# Patient Record
Sex: FEMALE | Race: BLACK OR AFRICAN AMERICAN | NOT HISPANIC OR LATINO | Employment: OTHER | URBAN - METROPOLITAN AREA
[De-identification: names, ages, dates, MRNs, and addresses within clinical notes are randomized per-mention and may not be internally consistent; named-entity substitution may affect disease eponyms.]

---

## 2017-01-06 ENCOUNTER — TELEPHONE (OUTPATIENT)
Dept: RADIOLOGY | Facility: HOSPITAL | Age: 62
End: 2017-01-06

## 2017-01-09 ENCOUNTER — HOSPITAL ENCOUNTER (OUTPATIENT)
Dept: RADIOLOGY | Facility: HOSPITAL | Age: 62
Discharge: HOME | End: 2017-01-09
Attending: PAIN MEDICINE
Payer: MEDICARE

## 2017-01-09 DIAGNOSIS — M54.12 CERVICAL RADICULOPATHY: ICD-10-CM

## 2017-01-09 DIAGNOSIS — M54.16 LUMBAR RADICULOPATHY: ICD-10-CM

## 2017-01-09 PROCEDURE — 72141 MRI NECK SPINE W/O DYE: CPT | Mod: 26,,, | Performed by: RADIOLOGY

## 2017-01-09 PROCEDURE — 72141 MRI NECK SPINE W/O DYE: CPT | Mod: TC,PO

## 2017-01-09 PROCEDURE — 72148 MRI LUMBAR SPINE W/O DYE: CPT | Mod: TC,PO

## 2017-01-09 PROCEDURE — 72148 MRI LUMBAR SPINE W/O DYE: CPT | Mod: 26,,, | Performed by: RADIOLOGY

## 2017-01-30 ENCOUNTER — TELEPHONE (OUTPATIENT)
Dept: SMOKING CESSATION | Facility: CLINIC | Age: 62
End: 2017-01-30

## 2017-01-30 NOTE — TELEPHONE ENCOUNTER
Notified Mrs. Spence that she can renew her benefits.  She will call me mid February to schedule sccon.

## 2017-02-14 ENCOUNTER — CLINICAL SUPPORT (OUTPATIENT)
Dept: SMOKING CESSATION | Facility: CLINIC | Age: 62
End: 2017-02-14
Payer: COMMERCIAL

## 2017-02-14 DIAGNOSIS — F17.200 NICOTINE DEPENDENCE: Primary | ICD-10-CM

## 2017-02-14 PROCEDURE — 99407 BEHAV CHNG SMOKING > 10 MIN: CPT | Mod: S$GLB,,, | Performed by: GENERAL PRACTICE

## 2017-04-07 RX ORDER — SODIUM, POTASSIUM,MAG SULFATES 17.5-3.13G
SOLUTION, RECONSTITUTED, ORAL ORAL
Qty: 354 ML | Refills: 0 | Status: SHIPPED | OUTPATIENT
Start: 2017-04-07 | End: 2022-02-15

## 2017-06-07 ENCOUNTER — LAB VISIT (OUTPATIENT)
Dept: LAB | Facility: HOSPITAL | Age: 62
End: 2017-06-07
Attending: INTERNAL MEDICINE
Payer: MEDICARE

## 2017-06-07 DIAGNOSIS — N18.30 CHRONIC KIDNEY DISEASE, STAGE III (MODERATE): ICD-10-CM

## 2017-06-07 DIAGNOSIS — N18.30 CHRONIC KIDNEY DISEASE, STAGE III (MODERATE): Primary | ICD-10-CM

## 2017-06-07 LAB
ALBUMIN SERPL BCP-MCNC: 3.4 G/DL
ANION GAP SERPL CALC-SCNC: 13 MMOL/L
BUN SERPL-MCNC: 13 MG/DL
CALCIUM SERPL-MCNC: 10.6 MG/DL
CHLORIDE SERPL-SCNC: 100 MMOL/L
CO2 SERPL-SCNC: 26 MMOL/L
CREAT SERPL-MCNC: 1.1 MG/DL
EST. GFR  (AFRICAN AMERICAN): >60 ML/MIN/1.73 M^2
EST. GFR  (NON AFRICAN AMERICAN): 54.3 ML/MIN/1.73 M^2
GLUCOSE SERPL-MCNC: 125 MG/DL
PHOSPHATE SERPL-MCNC: 3.5 MG/DL
POTASSIUM SERPL-SCNC: 4.4 MMOL/L
SODIUM SERPL-SCNC: 139 MMOL/L

## 2017-06-07 PROCEDURE — 86334 IMMUNOFIX E-PHORESIS SERUM: CPT | Mod: 26,,, | Performed by: PATHOLOGY

## 2017-06-07 PROCEDURE — 36415 COLL VENOUS BLD VENIPUNCTURE: CPT | Mod: PO

## 2017-06-07 PROCEDURE — 80069 RENAL FUNCTION PANEL: CPT | Mod: PO

## 2017-06-07 PROCEDURE — 83970 ASSAY OF PARATHORMONE: CPT

## 2017-06-07 PROCEDURE — 86334 IMMUNOFIX E-PHORESIS SERUM: CPT

## 2017-06-07 PROCEDURE — 82306 VITAMIN D 25 HYDROXY: CPT

## 2017-06-08 LAB
25(OH)D3+25(OH)D2 SERPL-MCNC: 19 NG/ML
INTERPRETATION SERPL IFE-IMP: NORMAL
PATHOLOGIST INTERPRETATION IFE: NORMAL
PTH-INTACT SERPL-MCNC: 36 PG/ML

## 2017-07-03 DIAGNOSIS — N18.30 CHRONIC KIDNEY DISEASE, STAGE III (MODERATE): Primary | ICD-10-CM

## 2017-07-05 ENCOUNTER — LAB VISIT (OUTPATIENT)
Dept: LAB | Facility: HOSPITAL | Age: 62
End: 2017-07-05
Attending: INTERNAL MEDICINE
Payer: MEDICARE

## 2017-07-05 DIAGNOSIS — N18.30 CHRONIC KIDNEY DISEASE, STAGE III (MODERATE): ICD-10-CM

## 2017-07-05 DIAGNOSIS — N18.30 CHRONIC KIDNEY DISEASE, STAGE III (MODERATE): Primary | ICD-10-CM

## 2017-07-05 LAB
ALBUMIN SERPL BCP-MCNC: 3.2 G/DL
ANION GAP SERPL CALC-SCNC: 14 MMOL/L
BASOPHILS # BLD AUTO: 0.04 K/UL
BASOPHILS NFR BLD: 0.3 %
BUN SERPL-MCNC: 19 MG/DL
CALCIUM SERPL-MCNC: 10.3 MG/DL
CHLORIDE SERPL-SCNC: 96 MMOL/L
CO2 SERPL-SCNC: 26 MMOL/L
CREAT SERPL-MCNC: 1.1 MG/DL
DIFFERENTIAL METHOD: ABNORMAL
EOSINOPHIL # BLD AUTO: 0.2 K/UL
EOSINOPHIL NFR BLD: 1.6 %
ERYTHROCYTE [DISTWIDTH] IN BLOOD BY AUTOMATED COUNT: 13 %
EST. GFR  (AFRICAN AMERICAN): >60 ML/MIN/1.73 M^2
EST. GFR  (NON AFRICAN AMERICAN): 54.3 ML/MIN/1.73 M^2
GLUCOSE SERPL-MCNC: 244 MG/DL
HCT VFR BLD AUTO: 47.2 %
HGB BLD-MCNC: 15.3 G/DL
LYMPHOCYTES # BLD AUTO: 2.4 K/UL
LYMPHOCYTES NFR BLD: 19.7 %
MCH RBC QN AUTO: 29.4 PG
MCHC RBC AUTO-ENTMCNC: 32.4 %
MCV RBC AUTO: 91 FL
MONOCYTES # BLD AUTO: 0.4 K/UL
MONOCYTES NFR BLD: 3.3 %
NEUTROPHILS # BLD AUTO: 9.2 K/UL
NEUTROPHILS NFR BLD: 74.9 %
PHOSPHATE SERPL-MCNC: 4.1 MG/DL
PLATELET # BLD AUTO: 414 K/UL
PLATELET # BLD AUTO: 414 K/UL
PMV BLD AUTO: 10.3 FL
PMV BLD AUTO: 10.3 FL
POTASSIUM SERPL-SCNC: 4.3 MMOL/L
RBC # BLD AUTO: 5.2 M/UL
SODIUM SERPL-SCNC: 136 MMOL/L
WBC # BLD AUTO: 12.22 K/UL

## 2017-07-05 PROCEDURE — 80069 RENAL FUNCTION PANEL: CPT | Mod: PO

## 2017-07-05 PROCEDURE — 36415 COLL VENOUS BLD VENIPUNCTURE: CPT | Mod: PO

## 2017-07-05 PROCEDURE — 85025 COMPLETE CBC W/AUTO DIFF WBC: CPT | Mod: PO

## 2018-04-17 ENCOUNTER — CLINICAL SUPPORT (OUTPATIENT)
Dept: SMOKING CESSATION | Facility: CLINIC | Age: 63
End: 2018-04-17
Payer: COMMERCIAL

## 2018-04-17 DIAGNOSIS — F17.200 NICOTINE DEPENDENCE: Primary | ICD-10-CM

## 2018-04-17 PROCEDURE — 99407 BEHAV CHNG SMOKING > 10 MIN: CPT | Mod: S$GLB,,, | Performed by: INTERNAL MEDICINE

## 2018-06-07 ENCOUNTER — HOSPITAL ENCOUNTER (EMERGENCY)
Facility: HOSPITAL | Age: 63
Discharge: HOME | End: 2018-06-07
Attending: EMERGENCY MEDICINE
Payer: MEDICARE

## 2018-06-07 VITALS
DIASTOLIC BLOOD PRESSURE: 66 MMHG | RESPIRATION RATE: 18 BRPM | OXYGEN SATURATION: 96 % | SYSTOLIC BLOOD PRESSURE: 124 MMHG | WEIGHT: 176.38 LBS | BODY MASS INDEX: 34.45 KG/M2 | TEMPERATURE: 98 F | HEART RATE: 89 BPM

## 2018-06-07 DIAGNOSIS — J02.9 SORE THROAT: Primary | ICD-10-CM

## 2018-06-07 DIAGNOSIS — R05.9 COUGH: ICD-10-CM

## 2018-06-07 LAB — DEPRECATED S PYO AG THROAT QL EIA: NEGATIVE

## 2018-06-07 PROCEDURE — 87081 CULTURE SCREEN ONLY: CPT

## 2018-06-07 PROCEDURE — 87880 STREP A ASSAY W/OPTIC: CPT

## 2018-06-07 PROCEDURE — 99284 EMERGENCY DEPT VISIT MOD MDM: CPT

## 2018-06-07 RX ORDER — FOLIC ACID 1 MG/1
1 TABLET ORAL
COMMUNITY
Start: 2018-05-18 | End: 2019-05-18

## 2018-06-07 RX ORDER — INSULIN DEGLUDEC 200 U/ML
INJECTION, SOLUTION SUBCUTANEOUS
COMMUNITY
Start: 2017-12-13 | End: 2022-02-15

## 2018-06-07 RX ORDER — FERROUS SULFATE 325(65) MG
325 TABLET ORAL
COMMUNITY
Start: 2018-05-18 | End: 2019-05-18

## 2018-06-07 RX ORDER — ICOSAPENT ETHYL 1000 MG/1
2 CAPSULE ORAL
COMMUNITY
Start: 2018-03-13

## 2018-06-07 RX ORDER — PROMETHAZINE HYDROCHLORIDE AND DEXTROMETHORPHAN HYDROBROMIDE 6.25; 15 MG/5ML; MG/5ML
5 SYRUP ORAL EVERY 6 HOURS PRN
Qty: 120 ML | Refills: 0 | Status: SHIPPED | OUTPATIENT
Start: 2018-06-07 | End: 2018-06-17

## 2018-06-07 RX ORDER — ROSUVASTATIN CALCIUM 5 MG/1
5 TABLET, COATED ORAL
COMMUNITY
Start: 2018-05-08 | End: 2019-05-08

## 2018-06-07 RX ORDER — MIRTAZAPINE 7.5 MG/1
7.5 TABLET, FILM COATED ORAL
COMMUNITY

## 2018-06-07 RX ORDER — PANTOPRAZOLE SODIUM 40 MG/1
40 TABLET, DELAYED RELEASE ORAL
COMMUNITY
End: 2022-08-16

## 2018-06-07 RX ORDER — LEVOTHYROXINE SODIUM 50 UG/1
50 TABLET ORAL
COMMUNITY

## 2018-06-07 RX ORDER — METFORMIN HYDROCHLORIDE 1000 MG/1
1000 TABLET ORAL
COMMUNITY
Start: 2018-05-19 | End: 2022-01-24

## 2018-06-07 RX ORDER — EZETIMIBE 10 MG/1
10 TABLET ORAL
COMMUNITY
Start: 2018-04-03 | End: 2019-04-03

## 2018-06-07 RX ORDER — AMLODIPINE BESYLATE 5 MG/1
2.5 TABLET ORAL
COMMUNITY
Start: 2018-05-17

## 2018-06-07 NOTE — ED PROVIDER NOTES
Encounter Date: 6/7/2018       History     Chief Complaint   Patient presents with    Sore Throat     sore throat and hoarse onset yesterday, recent adm for pneumonia     The history is provided by the patient.   Sore Throat   This is a new problem. The current episode started yesterday. The problem occurs constantly. The problem has not changed since onset.Pertinent negatives include no chest pain, no abdominal pain, no headaches and no shortness of breath. Nothing aggravates the symptoms. Nothing relieves the symptoms. She has tried nothing for the symptoms.     Review of patient's allergies indicates:  No Known Allergies  Past Medical History:   Diagnosis Date    Anemia     Anxiety     Anxiety associated with depression     High cholesterol     Hypertension     Mental disorder      Past Surgical History:   Procedure Laterality Date    CARPAL TUNNEL RELEASE      carpr       Family History   Problem Relation Age of Onset    Hypertension Father     Stroke Father     Ovarian cancer Mother     Breast cancer Cousin     Cancer Son         non Hodgskin lymphoma    Colon cancer Maternal Aunt     Diabetes Neg Hx      Social History   Substance Use Topics    Smoking status: Current Every Day Smoker     Packs/day: 1.00     Types: Cigarettes    Smokeless tobacco: Never Used    Alcohol use No     Review of Systems   Constitutional: Negative for fever.   HENT: Positive for sore throat.    Respiratory: Negative for shortness of breath.    Cardiovascular: Negative for chest pain.   Gastrointestinal: Negative for abdominal pain and nausea.   Genitourinary: Negative for dysuria.   Musculoskeletal: Negative for back pain.   Skin: Negative for rash.   Neurological: Negative for weakness and headaches.   Hematological: Does not bruise/bleed easily.       Physical Exam     Initial Vitals [06/07/18 0904]   BP Pulse Resp Temp SpO2   129/67 101 18 97.7 °F (36.5 °C) (!) 93 %      MAP       87.67         Physical  Exam    Nursing note and vitals reviewed.  Constitutional: She appears well-developed and well-nourished. No distress.   HENT:   Head: Normocephalic and atraumatic.   Mouth/Throat: Posterior oropharyngeal erythema present. No oropharyngeal exudate.   Eyes: Conjunctivae and EOM are normal. Pupils are equal, round, and reactive to light.   Neck: Normal range of motion. Neck supple.   Cardiovascular: Normal rate, regular rhythm and normal heart sounds. Exam reveals no gallop and no friction rub.    No murmur heard.  Pulmonary/Chest: Breath sounds normal. No respiratory distress. She has no wheezes. She has no rhonchi. She has no rales.   Abdominal: Soft. Bowel sounds are normal. She exhibits no distension and no mass. There is no tenderness. There is no rebound and no guarding.   Musculoskeletal: Normal range of motion. She exhibits no edema or tenderness.   Neurological: She is alert and oriented to person, place, and time. She has normal strength.   Skin: Skin is warm and dry. No rash noted.   Psychiatric: She has a normal mood and affect. Thought content normal.         ED Course   Procedures  Labs Reviewed   THROAT SCREEN, RAPID   CULTURE, STREP A,  THROAT          X-Ray Chest PA And Lateral   Final Result      No acute findings.         Electronically signed by: Srinath Garcia MD   Date:    06/07/2018   Time:    09:47        ED Vital Signs:  Vitals:    06/07/18 0904   BP: 129/67   Pulse: 101   Resp: 18   Temp: 97.7 °F (36.5 °C)   TempSrc: Oral   SpO2: (!) 93%   Weight: 80 kg (176 lb 6.4 oz)         Abnormal Lab Results:  Labs Reviewed   THROAT SCREEN, RAPID   CULTURE, STREP A,  THROAT          All Lab Results:  Results for orders placed or performed during the hospital encounter of 06/07/18   Rapid strep screen   Result Value Ref Range    Rapid Strep A Screen Negative Negative           Imaging Results:  Imaging Results          X-Ray Chest PA And Lateral (Final result)  Result time 06/07/18 09:47:47    Final  result by Francisco J Garcia Jr., MD (06/07/18 09:47:47)                 Impression:      No acute findings.      Electronically signed by: Francisco J Garcia MD  Date:    06/07/2018  Time:    09:47             Narrative:    EXAMINATION:  XR CHEST PA AND LATERAL    CLINICAL HISTORY:  cough;    COMPARISON:  No comparison studies are available.    FINDINGS:  Heart size is normal.  Lungs appear clear of active disease.  No infiltrates or effusions.  No suspicious mass.                                   The Emergency Provider reviewed the vital signs and test results, which are outlined above.    ED Discussions:  9:58 AM: Reassessed pt at this time.  Pt states her condition has improved at this time. Discussed with pt all pertinent ED information and results. Discussed pt dx of sore throat and cough and plan of tx. Gave pt all f/u and return to the ED instructions. All questions and concerns were addressed at this time. Pt expresses understanding of information and instructions, and is comfortable with plan to discharge. Pt is stable for discharge.                                  Clinical Impression:       ICD-10-CM ICD-9-CM   1. Sore throat J02.9 462   2. Cough R05 786.2           Disposition:   Disposition: Discharged  Condition: Stable                        Aristeo Whiting MD  06/07/18 0958

## 2018-06-09 LAB — BACTERIA THROAT CULT: NORMAL

## 2018-06-29 ENCOUNTER — LAB VISIT (OUTPATIENT)
Dept: LAB | Facility: HOSPITAL | Age: 63
End: 2018-06-29
Attending: INTERNAL MEDICINE
Payer: MEDICARE

## 2018-06-29 DIAGNOSIS — N18.30 CHRONIC KIDNEY DISEASE, STAGE III (MODERATE): ICD-10-CM

## 2018-06-29 DIAGNOSIS — N18.30 CHRONIC KIDNEY DISEASE, STAGE III (MODERATE): Primary | ICD-10-CM

## 2018-06-29 LAB
25(OH)D3+25(OH)D2 SERPL-MCNC: 46 NG/ML
ALBUMIN SERPL BCP-MCNC: 3.3 G/DL
ANION GAP SERPL CALC-SCNC: 16 MMOL/L
BASOPHILS # BLD AUTO: 0.06 K/UL
BASOPHILS NFR BLD: 0.5 %
BUN SERPL-MCNC: 15 MG/DL
CALCIUM SERPL-MCNC: 9.6 MG/DL
CHLORIDE SERPL-SCNC: 101 MMOL/L
CO2 SERPL-SCNC: 26 MMOL/L
CREAT SERPL-MCNC: 1 MG/DL
DIFFERENTIAL METHOD: ABNORMAL
EOSINOPHIL # BLD AUTO: 0.2 K/UL
EOSINOPHIL NFR BLD: 1.7 %
ERYTHROCYTE [DISTWIDTH] IN BLOOD BY AUTOMATED COUNT: 16.1 %
EST. GFR  (AFRICAN AMERICAN): >60 ML/MIN/1.73 M^2
EST. GFR  (NON AFRICAN AMERICAN): >60 ML/MIN/1.73 M^2
GLUCOSE SERPL-MCNC: 182 MG/DL
HCT VFR BLD AUTO: 39.3 %
HGB BLD-MCNC: 12.2 G/DL
LYMPHOCYTES # BLD AUTO: 2.4 K/UL
LYMPHOCYTES NFR BLD: 18.3 %
MCH RBC QN AUTO: 25.1 PG
MCHC RBC AUTO-ENTMCNC: 31 G/DL
MCV RBC AUTO: 81 FL
MONOCYTES # BLD AUTO: 0.3 K/UL
MONOCYTES NFR BLD: 2.6 %
NEUTROPHILS # BLD AUTO: 9.9 K/UL
NEUTROPHILS NFR BLD: 76.6 %
PHOSPHATE SERPL-MCNC: 2.2 MG/DL
PLATELET # BLD AUTO: 427 K/UL
PLATELET # BLD AUTO: 427 K/UL
PMV BLD AUTO: 10.2 FL
PMV BLD AUTO: 10.2 FL
POTASSIUM SERPL-SCNC: 3.4 MMOL/L
PTH-INTACT SERPL-MCNC: 85 PG/ML
RBC # BLD AUTO: 4.86 M/UL
SODIUM SERPL-SCNC: 143 MMOL/L
WBC # BLD AUTO: 12.85 K/UL

## 2018-06-29 PROCEDURE — 83970 ASSAY OF PARATHORMONE: CPT

## 2018-06-29 PROCEDURE — 85025 COMPLETE CBC W/AUTO DIFF WBC: CPT | Mod: PO

## 2018-06-29 PROCEDURE — 36415 COLL VENOUS BLD VENIPUNCTURE: CPT | Mod: PO

## 2018-06-29 PROCEDURE — 82306 VITAMIN D 25 HYDROXY: CPT

## 2018-06-29 PROCEDURE — 80069 RENAL FUNCTION PANEL: CPT | Mod: PO

## 2019-01-04 DIAGNOSIS — N18.30 CHRONIC KIDNEY DISEASE, STAGE III (MODERATE): Primary | ICD-10-CM

## 2019-01-07 ENCOUNTER — LAB VISIT (OUTPATIENT)
Dept: LAB | Facility: HOSPITAL | Age: 64
End: 2019-01-07
Attending: INTERNAL MEDICINE
Payer: MEDICARE

## 2019-01-07 DIAGNOSIS — N18.30 CHRONIC KIDNEY DISEASE, STAGE III (MODERATE): ICD-10-CM

## 2019-01-07 LAB
ALBUMIN SERPL BCP-MCNC: 3.9 G/DL
ANION GAP SERPL CALC-SCNC: 15 MMOL/L
BASOPHILS # BLD AUTO: 0.05 K/UL
BASOPHILS NFR BLD: 0.5 %
BUN SERPL-MCNC: 13 MG/DL
CALCIUM SERPL-MCNC: 9.9 MG/DL
CHLORIDE SERPL-SCNC: 98 MMOL/L
CO2 SERPL-SCNC: 30 MMOL/L
CREAT SERPL-MCNC: 1 MG/DL
DIFFERENTIAL METHOD: ABNORMAL
EOSINOPHIL # BLD AUTO: 0.2 K/UL
EOSINOPHIL NFR BLD: 1.5 %
ERYTHROCYTE [DISTWIDTH] IN BLOOD BY AUTOMATED COUNT: 14.3 %
EST. GFR  (AFRICAN AMERICAN): >60 ML/MIN/1.73 M^2
EST. GFR  (NON AFRICAN AMERICAN): >60 ML/MIN/1.73 M^2
GLUCOSE SERPL-MCNC: 81 MG/DL
HCT VFR BLD AUTO: 43.4 %
HGB BLD-MCNC: 14.4 G/DL
LYMPHOCYTES # BLD AUTO: 2.5 K/UL
LYMPHOCYTES NFR BLD: 22.4 %
MCH RBC QN AUTO: 28.5 PG
MCHC RBC AUTO-ENTMCNC: 33.2 G/DL
MCV RBC AUTO: 86 FL
MONOCYTES # BLD AUTO: 0.4 K/UL
MONOCYTES NFR BLD: 3.4 %
NEUTROPHILS # BLD AUTO: 7.9 K/UL
NEUTROPHILS NFR BLD: 72 %
PHOSPHATE SERPL-MCNC: 3.7 MG/DL
PLATELET # BLD AUTO: 355 K/UL
PMV BLD AUTO: 10.1 FL
POTASSIUM SERPL-SCNC: 3 MMOL/L
RBC # BLD AUTO: 5.06 M/UL
SODIUM SERPL-SCNC: 143 MMOL/L
WBC # BLD AUTO: 10.97 K/UL

## 2019-01-07 PROCEDURE — 85025 COMPLETE CBC W/AUTO DIFF WBC: CPT | Mod: PO,ER

## 2019-01-07 PROCEDURE — 36415 COLL VENOUS BLD VENIPUNCTURE: CPT | Mod: PO,ER

## 2019-01-07 PROCEDURE — 80069 RENAL FUNCTION PANEL: CPT | Mod: PO,ER

## 2019-01-24 ENCOUNTER — HOSPITAL ENCOUNTER (OUTPATIENT)
Dept: RADIOLOGY | Facility: HOSPITAL | Age: 64
Discharge: HOME | End: 2019-01-24
Attending: FAMILY MEDICINE
Payer: MEDICARE

## 2019-01-24 VITALS — WEIGHT: 154 LBS | HEIGHT: 60 IN | BODY MASS INDEX: 30.23 KG/M2

## 2019-01-24 DIAGNOSIS — Z12.31 ENCOUNTER FOR SCREENING MAMMOGRAM FOR MALIGNANT NEOPLASM OF BREAST: ICD-10-CM

## 2019-01-24 PROCEDURE — 77067 SCR MAMMO BI INCL CAD: CPT | Mod: 26,,, | Performed by: RADIOLOGY

## 2019-01-24 PROCEDURE — 77067 MAMMO DIGITAL SCREENING BILAT WITH CAD: ICD-10-PCS | Mod: 26,,, | Performed by: RADIOLOGY

## 2019-01-24 PROCEDURE — 77067 SCR MAMMO BI INCL CAD: CPT | Mod: TC,PO

## 2019-08-13 ENCOUNTER — LAB VISIT (OUTPATIENT)
Dept: LAB | Facility: HOSPITAL | Age: 64
End: 2019-08-13
Attending: INTERNAL MEDICINE
Payer: MEDICARE

## 2019-08-13 DIAGNOSIS — N18.30 CHRONIC KIDNEY DISEASE, STAGE III (MODERATE): Primary | ICD-10-CM

## 2019-08-13 LAB
25(OH)D3+25(OH)D2 SERPL-MCNC: 69 NG/ML (ref 30–96)
ALBUMIN SERPL BCP-MCNC: 3.7 G/DL (ref 3.5–5.2)
ANION GAP SERPL CALC-SCNC: 12 MMOL/L (ref 8–16)
BASOPHILS # BLD AUTO: 0.04 K/UL (ref 0–0.2)
BASOPHILS NFR BLD: 0.4 % (ref 0–1.9)
BUN SERPL-MCNC: 16 MG/DL (ref 8–23)
CALCIUM SERPL-MCNC: 10.3 MG/DL (ref 8.7–10.5)
CHLORIDE SERPL-SCNC: 103 MMOL/L (ref 95–110)
CO2 SERPL-SCNC: 26 MMOL/L (ref 23–29)
CREAT SERPL-MCNC: 1 MG/DL (ref 0.5–1.4)
DIFFERENTIAL METHOD: ABNORMAL
EOSINOPHIL # BLD AUTO: 0.1 K/UL (ref 0–0.5)
EOSINOPHIL NFR BLD: 1.2 % (ref 0–8)
ERYTHROCYTE [DISTWIDTH] IN BLOOD BY AUTOMATED COUNT: 13.4 % (ref 11.5–14.5)
EST. GFR  (AFRICAN AMERICAN): >60 ML/MIN/1.73 M^2
EST. GFR  (NON AFRICAN AMERICAN): >60 ML/MIN/1.73 M^2
GLUCOSE SERPL-MCNC: 77 MG/DL (ref 70–110)
HCT VFR BLD AUTO: 44.9 % (ref 37–48.5)
HGB BLD-MCNC: 14.8 G/DL (ref 12–16)
LYMPHOCYTES # BLD AUTO: 2.8 K/UL (ref 1–4.8)
LYMPHOCYTES NFR BLD: 25.9 % (ref 18–48)
MCH RBC QN AUTO: 29.5 PG (ref 27–31)
MCHC RBC AUTO-ENTMCNC: 33 G/DL (ref 32–36)
MCV RBC AUTO: 90 FL (ref 82–98)
MONOCYTES # BLD AUTO: 0.4 K/UL (ref 0.3–1)
MONOCYTES NFR BLD: 3.6 % (ref 4–15)
NEUTROPHILS # BLD AUTO: 7.4 K/UL (ref 1.8–7.7)
NEUTROPHILS NFR BLD: 68.9 % (ref 38–73)
PHOSPHATE SERPL-MCNC: 3.9 MG/DL (ref 2.7–4.5)
PLATELET # BLD AUTO: 350 K/UL (ref 150–350)
PMV BLD AUTO: 9.7 FL (ref 9.2–12.9)
POTASSIUM SERPL-SCNC: 4.1 MMOL/L (ref 3.5–5.1)
PTH-INTACT SERPL-MCNC: 41 PG/ML (ref 9–77)
RBC # BLD AUTO: 5.01 M/UL (ref 4–5.4)
SODIUM SERPL-SCNC: 141 MMOL/L (ref 136–145)
WBC # BLD AUTO: 10.67 K/UL (ref 3.9–12.7)

## 2019-08-13 PROCEDURE — 82306 VITAMIN D 25 HYDROXY: CPT

## 2019-08-13 PROCEDURE — 83970 ASSAY OF PARATHORMONE: CPT

## 2019-08-13 PROCEDURE — 85025 COMPLETE CBC W/AUTO DIFF WBC: CPT | Mod: PO

## 2019-08-13 PROCEDURE — 80069 RENAL FUNCTION PANEL: CPT | Mod: PO

## 2019-08-13 PROCEDURE — 36415 COLL VENOUS BLD VENIPUNCTURE: CPT | Mod: PO

## 2020-02-24 ENCOUNTER — LAB VISIT (OUTPATIENT)
Dept: LAB | Facility: HOSPITAL | Age: 65
End: 2020-02-24
Attending: INTERNAL MEDICINE
Payer: MEDICARE

## 2020-02-24 DIAGNOSIS — N18.30 CHRONIC KIDNEY DISEASE, STAGE III (MODERATE): ICD-10-CM

## 2020-02-24 LAB
ALBUMIN SERPL BCP-MCNC: 3.6 G/DL (ref 3.5–5.2)
ANION GAP SERPL CALC-SCNC: 11 MMOL/L (ref 8–16)
BASOPHILS # BLD AUTO: 0.09 K/UL (ref 0–0.2)
BASOPHILS NFR BLD: 0.7 % (ref 0–1.9)
BUN SERPL-MCNC: 11 MG/DL (ref 8–23)
CALCIUM SERPL-MCNC: 9.6 MG/DL (ref 8.7–10.5)
CHLORIDE SERPL-SCNC: 101 MMOL/L (ref 95–110)
CO2 SERPL-SCNC: 31 MMOL/L (ref 23–29)
CREAT SERPL-MCNC: 0.9 MG/DL (ref 0.5–1.4)
DIFFERENTIAL METHOD: ABNORMAL
EOSINOPHIL # BLD AUTO: 0.2 K/UL (ref 0–0.5)
EOSINOPHIL NFR BLD: 1.3 % (ref 0–8)
ERYTHROCYTE [DISTWIDTH] IN BLOOD BY AUTOMATED COUNT: 12.6 % (ref 11.5–14.5)
EST. GFR  (AFRICAN AMERICAN): >60 ML/MIN/1.73 M^2
EST. GFR  (NON AFRICAN AMERICAN): >60 ML/MIN/1.73 M^2
GLUCOSE SERPL-MCNC: 123 MG/DL (ref 70–110)
HCT VFR BLD AUTO: 46.8 % (ref 37–48.5)
HGB BLD-MCNC: 15.4 G/DL (ref 12–16)
IMM GRANULOCYTES # BLD AUTO: 0.03 K/UL (ref 0–0.04)
IMM GRANULOCYTES NFR BLD AUTO: 0.2 % (ref 0–0.5)
LYMPHOCYTES # BLD AUTO: 2.9 K/UL (ref 1–4.8)
LYMPHOCYTES NFR BLD: 22.3 % (ref 18–48)
MCH RBC QN AUTO: 29.6 PG (ref 27–31)
MCHC RBC AUTO-ENTMCNC: 32.9 G/DL (ref 32–36)
MCV RBC AUTO: 90 FL (ref 82–98)
MONOCYTES # BLD AUTO: 0.4 K/UL (ref 0.3–1)
MONOCYTES NFR BLD: 2.9 % (ref 4–15)
NEUTROPHILS # BLD AUTO: 9.3 K/UL (ref 1.8–7.7)
NEUTROPHILS NFR BLD: 72.6 % (ref 38–73)
NRBC BLD-RTO: 0 /100 WBC
PHOSPHATE SERPL-MCNC: 2.7 MG/DL (ref 2.7–4.5)
PLATELET # BLD AUTO: 355 K/UL (ref 150–350)
PMV BLD AUTO: 9.5 FL (ref 9.2–12.9)
POTASSIUM SERPL-SCNC: 3.2 MMOL/L (ref 3.5–5.1)
RBC # BLD AUTO: 5.2 M/UL (ref 4–5.4)
SODIUM SERPL-SCNC: 143 MMOL/L (ref 136–145)
WBC # BLD AUTO: 12.85 K/UL (ref 3.9–12.7)

## 2020-02-24 PROCEDURE — 36415 COLL VENOUS BLD VENIPUNCTURE: CPT | Mod: PO

## 2020-02-24 PROCEDURE — 80069 RENAL FUNCTION PANEL: CPT | Mod: PO

## 2020-02-24 PROCEDURE — 85025 COMPLETE CBC W/AUTO DIFF WBC: CPT | Mod: PO

## 2020-08-31 ENCOUNTER — HOSPITAL ENCOUNTER (OUTPATIENT)
Dept: RADIOLOGY | Facility: HOSPITAL | Age: 65
Discharge: HOME | End: 2020-08-31
Attending: FAMILY MEDICINE
Payer: MEDICARE

## 2020-08-31 VITALS — WEIGHT: 154.13 LBS | BODY MASS INDEX: 30.26 KG/M2 | HEIGHT: 60 IN

## 2020-08-31 DIAGNOSIS — Z12.31 ENCOUNTER FOR SCREENING MAMMOGRAM FOR MALIGNANT NEOPLASM OF BREAST: ICD-10-CM

## 2020-08-31 PROCEDURE — 77067 MAMMO DIGITAL SCREENING BILAT WITH TOMOSYNTHESIS_CAD: ICD-10-PCS | Mod: 26,,, | Performed by: RADIOLOGY

## 2020-08-31 PROCEDURE — 77063 MAMMO DIGITAL SCREENING BILAT WITH TOMOSYNTHESIS_CAD: ICD-10-PCS | Mod: 26,,, | Performed by: RADIOLOGY

## 2020-08-31 PROCEDURE — 77067 SCR MAMMO BI INCL CAD: CPT | Mod: 26,,, | Performed by: RADIOLOGY

## 2020-08-31 PROCEDURE — 77067 SCR MAMMO BI INCL CAD: CPT | Mod: TC,PO

## 2020-08-31 PROCEDURE — 77063 BREAST TOMOSYNTHESIS BI: CPT | Mod: 26,,, | Performed by: RADIOLOGY

## 2020-10-12 ENCOUNTER — LAB VISIT (OUTPATIENT)
Dept: LAB | Facility: HOSPITAL | Age: 65
End: 2020-10-12
Attending: INTERNAL MEDICINE
Payer: MEDICARE

## 2020-10-12 DIAGNOSIS — R80.9 PROTEINURIA: ICD-10-CM

## 2020-10-12 DIAGNOSIS — N18.2 CHRONIC KIDNEY DISEASE, STAGE II (MILD): Primary | ICD-10-CM

## 2020-10-12 LAB
ALBUMIN SERPL BCP-MCNC: 3.7 G/DL (ref 3.5–5.2)
ANION GAP SERPL CALC-SCNC: 11 MMOL/L (ref 8–16)
BUN SERPL-MCNC: 12 MG/DL (ref 8–23)
CALCIUM SERPL-MCNC: 9.3 MG/DL (ref 8.7–10.5)
CHLORIDE SERPL-SCNC: 103 MMOL/L (ref 95–110)
CO2 SERPL-SCNC: 31 MMOL/L (ref 23–29)
CREAT SERPL-MCNC: 0.9 MG/DL (ref 0.5–1.4)
CREAT UR-MCNC: 9.7 MG/DL (ref 15–325)
EST. GFR  (AFRICAN AMERICAN): >60 ML/MIN/1.73 M^2
EST. GFR  (NON AFRICAN AMERICAN): >60 ML/MIN/1.73 M^2
GLUCOSE SERPL-MCNC: 78 MG/DL (ref 70–110)
PHOSPHATE SERPL-MCNC: 3.3 MG/DL (ref 2.7–4.5)
POTASSIUM SERPL-SCNC: 3.7 MMOL/L (ref 3.5–5.1)
PROT UR-MCNC: <7 MG/DL (ref 0–15)
PROT/CREAT UR: ABNORMAL MG/G{CREAT} (ref 0–0.2)
SODIUM SERPL-SCNC: 145 MMOL/L (ref 136–145)

## 2020-10-12 PROCEDURE — 36415 COLL VENOUS BLD VENIPUNCTURE: CPT | Mod: PO

## 2020-10-12 PROCEDURE — 80069 RENAL FUNCTION PANEL: CPT | Mod: PO

## 2020-10-12 PROCEDURE — 82570 ASSAY OF URINE CREATININE: CPT | Mod: PO

## 2021-02-02 ENCOUNTER — LAB VISIT (OUTPATIENT)
Dept: LAB | Facility: HOSPITAL | Age: 66
End: 2021-02-02
Attending: INTERNAL MEDICINE
Payer: MEDICARE

## 2021-02-02 DIAGNOSIS — N18.2 CHRONIC KIDNEY DISEASE, STAGE II (MILD): ICD-10-CM

## 2021-02-02 LAB
ALBUMIN SERPL BCP-MCNC: 3.8 G/DL (ref 3.5–5.2)
ANION GAP SERPL CALC-SCNC: 13 MMOL/L (ref 8–16)
BUN SERPL-MCNC: 12 MG/DL (ref 8–23)
CALCIUM SERPL-MCNC: 9.6 MG/DL (ref 8.7–10.5)
CHLORIDE SERPL-SCNC: 100 MMOL/L (ref 95–110)
CO2 SERPL-SCNC: 33 MMOL/L (ref 23–29)
CREAT SERPL-MCNC: 1 MG/DL (ref 0.5–1.4)
EST. GFR  (AFRICAN AMERICAN): >60 ML/MIN/1.73 M^2
EST. GFR  (NON AFRICAN AMERICAN): 59.3 ML/MIN/1.73 M^2
GLUCOSE SERPL-MCNC: 87 MG/DL (ref 70–110)
PHOSPHATE SERPL-MCNC: 3 MG/DL (ref 2.7–4.5)
POTASSIUM SERPL-SCNC: 3.3 MMOL/L (ref 3.5–5.1)
SODIUM SERPL-SCNC: 146 MMOL/L (ref 136–145)

## 2021-02-02 PROCEDURE — 36415 COLL VENOUS BLD VENIPUNCTURE: CPT | Mod: PO

## 2021-02-02 PROCEDURE — 80069 RENAL FUNCTION PANEL: CPT | Mod: PO

## 2021-09-23 ENCOUNTER — LAB VISIT (OUTPATIENT)
Dept: LAB | Facility: HOSPITAL | Age: 66
End: 2021-09-23
Attending: FAMILY MEDICINE
Payer: MEDICARE

## 2021-09-23 DIAGNOSIS — N18.2 CHRONIC KIDNEY DISEASE, STAGE II (MILD): Primary | ICD-10-CM

## 2021-09-23 LAB
ALBUMIN SERPL BCP-MCNC: 3.6 G/DL (ref 3.5–5.2)
ANION GAP SERPL CALC-SCNC: 14 MMOL/L (ref 8–16)
BASOPHILS # BLD AUTO: 0.1 K/UL (ref 0–0.2)
BASOPHILS NFR BLD: 0.9 % (ref 0–1.9)
BUN SERPL-MCNC: 14 MG/DL (ref 8–23)
CALCIUM SERPL-MCNC: 9.5 MG/DL (ref 8.7–10.5)
CHLORIDE SERPL-SCNC: 100 MMOL/L (ref 95–110)
CO2 SERPL-SCNC: 28 MMOL/L (ref 23–29)
CREAT SERPL-MCNC: 1 MG/DL (ref 0.5–1.4)
DIFFERENTIAL METHOD: ABNORMAL
EOSINOPHIL # BLD AUTO: 0.1 K/UL (ref 0–0.5)
EOSINOPHIL NFR BLD: 1.2 % (ref 0–8)
ERYTHROCYTE [DISTWIDTH] IN BLOOD BY AUTOMATED COUNT: 13.1 % (ref 11.5–14.5)
EST. GFR  (AFRICAN AMERICAN): >60 ML/MIN/1.73 M^2
EST. GFR  (NON AFRICAN AMERICAN): 59.3 ML/MIN/1.73 M^2
GLUCOSE SERPL-MCNC: 86 MG/DL (ref 70–110)
HCT VFR BLD AUTO: 45.5 % (ref 37–48.5)
HGB BLD-MCNC: 14.5 G/DL (ref 12–16)
IMM GRANULOCYTES # BLD AUTO: 0.05 K/UL (ref 0–0.04)
IMM GRANULOCYTES NFR BLD AUTO: 0.5 % (ref 0–0.5)
LYMPHOCYTES # BLD AUTO: 2.3 K/UL (ref 1–4.8)
LYMPHOCYTES NFR BLD: 21.4 % (ref 18–48)
MCH RBC QN AUTO: 29.6 PG (ref 27–31)
MCHC RBC AUTO-ENTMCNC: 31.9 G/DL (ref 32–36)
MCV RBC AUTO: 93 FL (ref 82–98)
MONOCYTES # BLD AUTO: 0.4 K/UL (ref 0.3–1)
MONOCYTES NFR BLD: 3.9 % (ref 4–15)
NEUTROPHILS # BLD AUTO: 7.6 K/UL (ref 1.8–7.7)
NEUTROPHILS NFR BLD: 72.1 % (ref 38–73)
NRBC BLD-RTO: 0 /100 WBC
PHOSPHATE SERPL-MCNC: 3.1 MG/DL (ref 2.7–4.5)
PLATELET # BLD AUTO: 354 K/UL (ref 150–450)
PMV BLD AUTO: 9.6 FL (ref 9.2–12.9)
POTASSIUM SERPL-SCNC: 4.1 MMOL/L (ref 3.5–5.1)
RBC # BLD AUTO: 4.9 M/UL (ref 4–5.4)
SODIUM SERPL-SCNC: 142 MMOL/L (ref 136–145)
WBC # BLD AUTO: 10.59 K/UL (ref 3.9–12.7)

## 2021-09-23 PROCEDURE — 36415 COLL VENOUS BLD VENIPUNCTURE: CPT | Mod: PO

## 2021-09-23 PROCEDURE — 80069 RENAL FUNCTION PANEL: CPT | Mod: PO

## 2021-09-23 PROCEDURE — 85025 COMPLETE CBC W/AUTO DIFF WBC: CPT | Mod: PO

## 2022-02-15 ENCOUNTER — OFFICE VISIT (OUTPATIENT)
Dept: DIABETES | Facility: CLINIC | Age: 67
End: 2022-02-15
Payer: MEDICARE

## 2022-02-15 VITALS
HEART RATE: 96 BPM | DIASTOLIC BLOOD PRESSURE: 89 MMHG | BODY MASS INDEX: 35.67 KG/M2 | HEIGHT: 60 IN | WEIGHT: 181.69 LBS | SYSTOLIC BLOOD PRESSURE: 133 MMHG

## 2022-02-15 DIAGNOSIS — E78.5 HYPERLIPIDEMIA ASSOCIATED WITH TYPE 2 DIABETES MELLITUS: ICD-10-CM

## 2022-02-15 DIAGNOSIS — I73.9 PVD (PERIPHERAL VASCULAR DISEASE): ICD-10-CM

## 2022-02-15 DIAGNOSIS — E03.9 HYPOTHYROIDISM, UNSPECIFIED TYPE: ICD-10-CM

## 2022-02-15 DIAGNOSIS — Z72.0 TOBACCO USE: ICD-10-CM

## 2022-02-15 DIAGNOSIS — E66.01 SEVERE OBESITY (BMI 35.0-35.9 WITH COMORBIDITY): ICD-10-CM

## 2022-02-15 DIAGNOSIS — I10 ESSENTIAL HYPERTENSION: ICD-10-CM

## 2022-02-15 DIAGNOSIS — Z79.4 CONTROLLED TYPE 2 DIABETES MELLITUS WITH STAGE 3 CHRONIC KIDNEY DISEASE, WITH LONG-TERM CURRENT USE OF INSULIN: Primary | ICD-10-CM

## 2022-02-15 DIAGNOSIS — N18.30 CONTROLLED TYPE 2 DIABETES MELLITUS WITH STAGE 3 CHRONIC KIDNEY DISEASE, WITH LONG-TERM CURRENT USE OF INSULIN: Primary | ICD-10-CM

## 2022-02-15 DIAGNOSIS — E11.22 CONTROLLED TYPE 2 DIABETES MELLITUS WITH STAGE 3 CHRONIC KIDNEY DISEASE, WITH LONG-TERM CURRENT USE OF INSULIN: Primary | ICD-10-CM

## 2022-02-15 DIAGNOSIS — E55.9 VITAMIN D DEFICIENCY: ICD-10-CM

## 2022-02-15 DIAGNOSIS — I71.40 ABDOMINAL ANEURYSM: ICD-10-CM

## 2022-02-15 DIAGNOSIS — E11.69 HYPERLIPIDEMIA ASSOCIATED WITH TYPE 2 DIABETES MELLITUS: ICD-10-CM

## 2022-02-15 LAB — GLUCOSE SERPL-MCNC: 103 MG/DL (ref 70–110)

## 2022-02-15 PROCEDURE — 1101F PR PT FALLS ASSESS DOC 0-1 FALLS W/OUT INJ PAST YR: ICD-10-PCS | Mod: CPTII,S$GLB,, | Performed by: NURSE PRACTITIONER

## 2022-02-15 PROCEDURE — 1126F AMNT PAIN NOTED NONE PRSNT: CPT | Mod: CPTII,S$GLB,, | Performed by: NURSE PRACTITIONER

## 2022-02-15 PROCEDURE — 1101F PT FALLS ASSESS-DOCD LE1/YR: CPT | Mod: CPTII,S$GLB,, | Performed by: NURSE PRACTITIONER

## 2022-02-15 PROCEDURE — 3288F PR FALLS RISK ASSESSMENT DOCUMENTED: ICD-10-PCS | Mod: CPTII,S$GLB,, | Performed by: NURSE PRACTITIONER

## 2022-02-15 PROCEDURE — 3075F SYST BP GE 130 - 139MM HG: CPT | Mod: CPTII,S$GLB,, | Performed by: NURSE PRACTITIONER

## 2022-02-15 PROCEDURE — 3079F DIAST BP 80-89 MM HG: CPT | Mod: CPTII,S$GLB,, | Performed by: NURSE PRACTITIONER

## 2022-02-15 PROCEDURE — 82962 POCT GLUCOSE, HAND-HELD DEVICE: ICD-10-PCS | Mod: S$GLB,,, | Performed by: NURSE PRACTITIONER

## 2022-02-15 PROCEDURE — 3075F PR MOST RECENT SYSTOLIC BLOOD PRESS GE 130-139MM HG: ICD-10-PCS | Mod: CPTII,S$GLB,, | Performed by: NURSE PRACTITIONER

## 2022-02-15 PROCEDURE — 99999 PR PBB SHADOW E&M-EST. PATIENT-LVL V: ICD-10-PCS | Mod: PBBFAC,,, | Performed by: NURSE PRACTITIONER

## 2022-02-15 PROCEDURE — 99999 PR PBB SHADOW E&M-EST. PATIENT-LVL V: CPT | Mod: PBBFAC,,, | Performed by: NURSE PRACTITIONER

## 2022-02-15 PROCEDURE — 3008F BODY MASS INDEX DOCD: CPT | Mod: CPTII,S$GLB,, | Performed by: NURSE PRACTITIONER

## 2022-02-15 PROCEDURE — 1159F MED LIST DOCD IN RCRD: CPT | Mod: CPTII,S$GLB,, | Performed by: NURSE PRACTITIONER

## 2022-02-15 PROCEDURE — 99214 PR OFFICE/OUTPT VISIT, EST, LEVL IV, 30-39 MIN: ICD-10-PCS | Mod: S$GLB,,, | Performed by: NURSE PRACTITIONER

## 2022-02-15 PROCEDURE — 1160F RVW MEDS BY RX/DR IN RCRD: CPT | Mod: CPTII,S$GLB,, | Performed by: NURSE PRACTITIONER

## 2022-02-15 PROCEDURE — 3008F PR BODY MASS INDEX (BMI) DOCUMENTED: ICD-10-PCS | Mod: CPTII,S$GLB,, | Performed by: NURSE PRACTITIONER

## 2022-02-15 PROCEDURE — 1159F PR MEDICATION LIST DOCUMENTED IN MEDICAL RECORD: ICD-10-PCS | Mod: CPTII,S$GLB,, | Performed by: NURSE PRACTITIONER

## 2022-02-15 PROCEDURE — 1126F PR PAIN SEVERITY QUANTIFIED, NO PAIN PRESENT: ICD-10-PCS | Mod: CPTII,S$GLB,, | Performed by: NURSE PRACTITIONER

## 2022-02-15 PROCEDURE — 3079F PR MOST RECENT DIASTOLIC BLOOD PRESSURE 80-89 MM HG: ICD-10-PCS | Mod: CPTII,S$GLB,, | Performed by: NURSE PRACTITIONER

## 2022-02-15 PROCEDURE — 3288F FALL RISK ASSESSMENT DOCD: CPT | Mod: CPTII,S$GLB,, | Performed by: NURSE PRACTITIONER

## 2022-02-15 PROCEDURE — 82962 GLUCOSE BLOOD TEST: CPT | Mod: S$GLB,,, | Performed by: NURSE PRACTITIONER

## 2022-02-15 PROCEDURE — 1160F PR REVIEW ALL MEDS BY PRESCRIBER/CLIN PHARMACIST DOCUMENTED: ICD-10-PCS | Mod: CPTII,S$GLB,, | Performed by: NURSE PRACTITIONER

## 2022-02-15 PROCEDURE — 99214 OFFICE O/P EST MOD 30 MIN: CPT | Mod: S$GLB,,, | Performed by: NURSE PRACTITIONER

## 2022-02-15 RX ORDER — LEVALBUTEROL TARTRATE 45 UG/1
2 AEROSOL, METERED ORAL EVERY 4 HOURS PRN
COMMUNITY
Start: 2021-11-01 | End: 2022-11-01

## 2022-02-15 RX ORDER — ACETAMINOPHEN 500 MG
1 TABLET ORAL
COMMUNITY

## 2022-02-15 RX ORDER — INSULIN DEGLUDEC 200 U/ML
INJECTION, SOLUTION SUBCUTANEOUS
COMMUNITY
End: 2022-03-10

## 2022-02-15 RX ORDER — FERROUS SULFATE 325(65) MG
TABLET ORAL
COMMUNITY

## 2022-02-15 RX ORDER — POTASSIUM CHLORIDE 20 MEQ/1
20 TABLET, EXTENDED RELEASE ORAL DAILY
COMMUNITY
Start: 2022-01-24

## 2022-02-15 RX ORDER — FUROSEMIDE 20 MG/1
20 TABLET ORAL DAILY
COMMUNITY
Start: 2021-12-14

## 2022-02-15 RX ORDER — VENLAFAXINE HYDROCHLORIDE 75 MG/1
75 CAPSULE, EXTENDED RELEASE ORAL DAILY
COMMUNITY
Start: 2022-02-10

## 2022-02-15 RX ORDER — PEN NEEDLE, DIABETIC 31 GX5/16"
NEEDLE, DISPOSABLE MISCELLANEOUS DAILY
COMMUNITY
Start: 2021-12-30 | End: 2022-04-25

## 2022-02-15 RX ORDER — EMPAGLIFLOZIN 25 MG/1
25 TABLET, FILM COATED ORAL DAILY
COMMUNITY
Start: 2022-01-24 | End: 2022-08-16 | Stop reason: SDUPTHER

## 2022-02-15 RX ORDER — MELOXICAM 7.5 MG/1
7.5 TABLET ORAL DAILY
COMMUNITY
Start: 2021-12-16

## 2022-02-15 RX ORDER — UMECLIDINIUM BROMIDE AND VILANTEROL TRIFENATATE 62.5; 25 UG/1; UG/1
1 POWDER RESPIRATORY (INHALATION) DAILY
COMMUNITY
Start: 2021-11-30

## 2022-02-15 NOTE — PROGRESS NOTES
Subjective:         Patient ID: Bebe Sandhu is a 66 y.o. female.  Patient's current PCP is Haroldo Fernandes MD.     Chief Complaint: Diabetes Mellitus    HPI  Bebe Sandhu is a 66 y.o. Black or  female presenting for a new consult with me, previously seen by myself at [facility] for diabetes. Patient has been diagnosed with type 2 diabetes since 10/2017 .    CURRENT DM MEDICATIONS:   Diabetes Medications             insulin degludec (TRESIBA FLEXTOUCH U-200) 200 unit/mL (3 mL) insulin pen 14 units    JARDIANCE 25 mg tablet Take 25 mg by mouth once daily.    metFORMIN (GLUCOPHAGE) 1000 MG tablet TAKE ONE TABLET BY MOUTH TWICE A DAY WITH FOOD        Past failed treatment include: Januvia d'c'd due to elevated lipase    Blood glucose testing is performed regularly. Patient is testing 3 times per day.  Meter:  True Metrix --Interested in Sigrid, referral today.  Preferred lab: With PCP office- Dr. Fernandes    Any episodes of hypoglycemia? Denies- lowest at 84. She is at risk of hypoglycemia on current insulin.    Complications related to diabetes: nephropathy, retinopathy and peripheral vascular disease    Her blood sugar in the clinic today was:   Lab Results   Component Value Date    POCGLU 103 02/15/2022       Bebe Sandhu presents today for follow up visit to discuss diabetes management. No meter/logs today. Over the past year, one of her sisters as well as her son passed away. Living with another sister-high carb diet. Highest blood sugar she has seen at 283, lowest at 84. High glycemic variability. Reviewed labs with patient during OV (Care Everywhere). Tolerating current medications well without side effects.     CKD III- Sees Dr. Schroeder.     GI- Dr. Louis- History of elevated lipase-denies history of pancreatitis. Most recent liver enzymes normal. Pt states she was told she likely had hepatitis A as a child.     Familial hyperlipidemia- Established with  Janelle Doherty NP with lipidologist Dr. Flannery. Taking Zetia, Vascepa, and Crestor daily. Also sees cardiologist, Dr. Flores.     Sees Dr. Ragland - Followed for abdominal aneurysm.     Hypothyroidism- She takes Levothyroxine 50 mcg daily.     Vitamin D level -taking Vitamin D 5,000 International Units daily.    Current diet: Diabetic- has been eating more starches lately  Activity Level: no structured exercise        Lab Results   Component Value Date    HGBA1C 5.3 06/02/2020     STANDARDS OF CARE  Eye exam: [facility]- cataracts only, DR noted, reports elevated pressure noted. Saw recently.   Foot exam:Pt requests to defer  Ace/Arb: Accupril  Statin:Crestor    Labs reviewed and are noted below. PCP-Care Everywhere- 12/14/2021            Lab Results   Component Value Date    WBC 10.59 09/23/2021    HGB 14.5 09/23/2021    HCT 45.5 09/23/2021     09/23/2021     09/23/2021    K 4.1 09/23/2021     09/23/2021    ANIONGAP 14 09/23/2021    CREATININE 1.0 09/23/2021    ESTGFRAFRICA >60.0 09/23/2021    EGFRNONAA 59.3 (A) 09/23/2021    BUN 14 09/23/2021    CO2 28 09/23/2021    GLU 86 09/23/2021    UTPCR Unable to calculate 10/12/2020     Lab Results   Component Value Date    PTH 41.0 08/13/2019    CALCIUM 9.5 09/23/2021    PHOS 3.1 09/23/2021     No results found for: CPEPTIDE  No results found for: GLUTAMICACID  Glucose   Date Value Ref Range Status   09/23/2021 86 70 - 110 mg/dL Final     Anion Gap   Date Value Ref Range Status   09/23/2021 14 8 - 16 mmol/L Final     eGFR if    Date Value Ref Range Status   09/23/2021 >60.0 >60 mL/min/1.73 m^2 Final     eGFR if non    Date Value Ref Range Status   09/23/2021 59.3 (A) >60 mL/min/1.73 m^2 Final     Comment:     Calculation used to obtain the estimated glomerular filtration  rate (eGFR) is the CKD-EPI equation.          The following portions of the patient's history were reviewed and updated as appropriate:  allergies, current medications, past family history, past medical history, past social history, past surgical history and problem list.    Review of patient's allergies indicates:   Allergen Reactions    Sulfa (sulfonamide antibiotics) Rash     Social History     Socioeconomic History    Marital status:    Tobacco Use    Smoking status: Current Every Day Smoker     Packs/day: 1.00     Types: Cigarettes    Smokeless tobacco: Never Used   Substance and Sexual Activity    Alcohol use: No    Drug use: No    Sexual activity: Not Currently     Past Medical History:   Diagnosis Date    Anemia     Anxiety     Anxiety associated with depression     High cholesterol     Hypertension     Mental disorder        REVIEW OF SYSTEMS:  Eyes History of DR.  Cardiovascular: History of HTn and hyperlipidemia.  GI: History of elevated lipase in the past.  : History of nephropathy.  Neuro: No neuropathy.  PSYCH: Current tobacco use. Working on lessening.  ENDO: See HPI.        Objective:      Vitals:    02/15/22 1040   BP: 133/89   Pulse: 96     RESPIRATORY: No respiratory distress  NEUROLOGIC: Cranial nerves II-XII grossly intact.   PSYCHIATRIC: Alert & oriented x3. Normal mood and affect.  FOOT EXAMINATION: Patient requests to defer to next visit    Assessment:       1. Controlled type 2 diabetes mellitus with stage 3 chronic kidney disease, with long-term current use of insulin    2. Hyperlipidemia associated with type 2 diabetes mellitus    3. Essential hypertension    4. Hypothyroidism, unspecified type    5. PVD (peripheral vascular disease)    6. Abdominal aneurysm    7. Vitamin D deficiency    8. Tobacco use    9. Severe obesity (BMI 35.0-35.9 with comorbidity)        Plan:   Controlled type 2 diabetes mellitus with stage 3 chronic kidney disease, with long-term current use of insulin-Chronic,stable  -     POCT Glucose, Hand-Held Device    -Referral Sigrid 2 to [facility].    Medications adjusted  "for today's visit include:    Continue Tresiba 14 units once a day.     Continue Metformin     Continue Jardiance to 25 mg daily.    Hyperlipidemia associated with type 2 diabetes mellitus-Chronic,stable    -Continue Crestor, Vascepa, and Zetia. Managed per lipid specialist.    Essential hypertension-Chronic,stable    -BP is at goal. Continue current medications.    Hypothyroidism, unspecified type-Chronic,stable    -Continue Levothyroxine 50 mcg daily.    PVD (peripheral vascular disease)-Chronic,stable  Abdominal aneurysm-Chronic,stable    -Follow up with vascular specialist.    Vitamin D deficiency-Chronic,stable    -Continue Vit D 5,000 International Units daily.    Tobacco use    -She is working towards lessening smoking-- reviewed importance of this.    Severe obesity (BMI 35.0-35.9 with comorbidity)-Chronic,Stable    -Stressed importance of returning to low carb, heart healthy diet. Advised protein only snacks and to portion out carb servings. Increase exercise as tolerated.    - Follow up: 6 months    I spent a total of 30 minutes on the day of the visit.This includes face to face time and non-face to face time preparing to see the patient (eg, review of tests), documenting clinical information in the electronic record, independently interpreting results and communicating results to the patient.    Portions of this note may have been created with voice recognition software. Occasional "wrong-word" or "sound-a-like" substitutions may have occurred due to the inherent limitations of voice recognition software. Please, read the note carefully and recognize, using context, where substitutions have occurred.       "

## 2022-02-15 NOTE — PATIENT INSTRUCTIONS
1. Limit carbs to no more than 45 grams with each meal. Never eat carbs by themselves, always add protein. Make snacks low carb or non-carb only; Stick to snack sheet provided    2. Continue checking blood sugar 3 times daily: Always check before meals and occasionally 2 hours after any meal or bedtime. Blood sugar goals should be a fasting blood sugar between , and no blood sugars throughout the day over 180 is ok, less than 160 better, less than 140 perfect. Keep a log book and bring with you to all appointments along with your glucose meter.    3. Medications adjusted for today's visit include:  Continue Tresiba 14 units once a day.     Continue Metformin     Continue Jardiance to 25 mg daily.    4. Carry glucose tablets with you at all times to treat a low blood sugar episode (less than 70). Chew 4 tablets and re-check blood sugar in 15 minutes. If still low, repeat this. Always call the clinic to give an update for any low blood sugar episodes.    5. Continue efforts to increase exercise as tolerated.    6. Follow-up for your next visit in 6 months or sooner if needed.    7. Please either drop off, fax, or MyChart your readings to me as needed    Drop off or fax: Brayden Chou

## 2022-02-15 NOTE — PROGRESS NOTES
Patient, Bebe Sandhu (MRN #[id number]), presented with a recorded BMI of 35.48 kg/m^2 and a documented comorbidity(s):  - Diabetes Mellitus Type 2  - Hypertension  - Hyperlipidemia  to which the severe obesity is a contributing factor. This is consistent with the definition of severe obesity (BMI 35.0-39.9) with comorbidity (ICD-10 E66.01, Z68.35). The patient's severe obesity was monitored, evaluated, addressed and/or treated. This addendum to the medical record is made on 02/15/2022.

## 2022-06-14 LAB
LEFT EYE DM RETINOPATHY: NEGATIVE
RIGHT EYE DM RETINOPATHY: NEGATIVE

## 2022-08-16 ENCOUNTER — OFFICE VISIT (OUTPATIENT)
Dept: DIABETES | Facility: CLINIC | Age: 67
End: 2022-08-16
Payer: MEDICARE

## 2022-08-16 VITALS
WEIGHT: 180.13 LBS | HEART RATE: 86 BPM | BODY MASS INDEX: 35.37 KG/M2 | HEIGHT: 60 IN | SYSTOLIC BLOOD PRESSURE: 110 MMHG | DIASTOLIC BLOOD PRESSURE: 74 MMHG

## 2022-08-16 DIAGNOSIS — I73.9 PVD (PERIPHERAL VASCULAR DISEASE): ICD-10-CM

## 2022-08-16 DIAGNOSIS — E11.22 CONTROLLED TYPE 2 DIABETES MELLITUS WITH STAGE 3 CHRONIC KIDNEY DISEASE, WITH LONG-TERM CURRENT USE OF INSULIN: Primary | ICD-10-CM

## 2022-08-16 DIAGNOSIS — I10 ESSENTIAL HYPERTENSION: ICD-10-CM

## 2022-08-16 DIAGNOSIS — N18.30 CONTROLLED TYPE 2 DIABETES MELLITUS WITH STAGE 3 CHRONIC KIDNEY DISEASE, WITH LONG-TERM CURRENT USE OF INSULIN: Primary | ICD-10-CM

## 2022-08-16 DIAGNOSIS — E78.5 HYPERLIPIDEMIA ASSOCIATED WITH TYPE 2 DIABETES MELLITUS: ICD-10-CM

## 2022-08-16 DIAGNOSIS — I71.40 ABDOMINAL ANEURYSM: ICD-10-CM

## 2022-08-16 DIAGNOSIS — E03.9 HYPOTHYROIDISM, UNSPECIFIED TYPE: ICD-10-CM

## 2022-08-16 DIAGNOSIS — E55.9 VITAMIN D DEFICIENCY: ICD-10-CM

## 2022-08-16 DIAGNOSIS — E11.69 HYPERLIPIDEMIA ASSOCIATED WITH TYPE 2 DIABETES MELLITUS: ICD-10-CM

## 2022-08-16 DIAGNOSIS — Z79.4 CONTROLLED TYPE 2 DIABETES MELLITUS WITH STAGE 3 CHRONIC KIDNEY DISEASE, WITH LONG-TERM CURRENT USE OF INSULIN: Primary | ICD-10-CM

## 2022-08-16 LAB — GLUCOSE SERPL-MCNC: 101 MG/DL (ref 70–110)

## 2022-08-16 PROCEDURE — 3074F PR MOST RECENT SYSTOLIC BLOOD PRESSURE < 130 MM HG: ICD-10-PCS | Mod: CPTII,S$GLB,, | Performed by: NURSE PRACTITIONER

## 2022-08-16 PROCEDURE — 4010F ACE/ARB THERAPY RXD/TAKEN: CPT | Mod: CPTII,S$GLB,, | Performed by: NURSE PRACTITIONER

## 2022-08-16 PROCEDURE — 1101F PR PT FALLS ASSESS DOC 0-1 FALLS W/OUT INJ PAST YR: ICD-10-PCS | Mod: CPTII,S$GLB,, | Performed by: NURSE PRACTITIONER

## 2022-08-16 PROCEDURE — 1159F MED LIST DOCD IN RCRD: CPT | Mod: CPTII,S$GLB,, | Performed by: NURSE PRACTITIONER

## 2022-08-16 PROCEDURE — 82962 GLUCOSE BLOOD TEST: CPT | Mod: S$GLB,,, | Performed by: NURSE PRACTITIONER

## 2022-08-16 PROCEDURE — 1126F AMNT PAIN NOTED NONE PRSNT: CPT | Mod: CPTII,S$GLB,, | Performed by: NURSE PRACTITIONER

## 2022-08-16 PROCEDURE — 4010F PR ACE/ARB THEARPY RXD/TAKEN: ICD-10-PCS | Mod: CPTII,S$GLB,, | Performed by: NURSE PRACTITIONER

## 2022-08-16 PROCEDURE — 3008F PR BODY MASS INDEX (BMI) DOCUMENTED: ICD-10-PCS | Mod: CPTII,S$GLB,, | Performed by: NURSE PRACTITIONER

## 2022-08-16 PROCEDURE — 3078F PR MOST RECENT DIASTOLIC BLOOD PRESSURE < 80 MM HG: ICD-10-PCS | Mod: CPTII,S$GLB,, | Performed by: NURSE PRACTITIONER

## 2022-08-16 PROCEDURE — 99999 PR PBB SHADOW E&M-EST. PATIENT-LVL V: ICD-10-PCS | Mod: PBBFAC,,, | Performed by: NURSE PRACTITIONER

## 2022-08-16 PROCEDURE — 99214 OFFICE O/P EST MOD 30 MIN: CPT | Mod: S$GLB,,, | Performed by: NURSE PRACTITIONER

## 2022-08-16 PROCEDURE — 3078F DIAST BP <80 MM HG: CPT | Mod: CPTII,S$GLB,, | Performed by: NURSE PRACTITIONER

## 2022-08-16 PROCEDURE — 1126F PR PAIN SEVERITY QUANTIFIED, NO PAIN PRESENT: ICD-10-PCS | Mod: CPTII,S$GLB,, | Performed by: NURSE PRACTITIONER

## 2022-08-16 PROCEDURE — 3074F SYST BP LT 130 MM HG: CPT | Mod: CPTII,S$GLB,, | Performed by: NURSE PRACTITIONER

## 2022-08-16 PROCEDURE — 99214 PR OFFICE/OUTPT VISIT, EST, LEVL IV, 30-39 MIN: ICD-10-PCS | Mod: S$GLB,,, | Performed by: NURSE PRACTITIONER

## 2022-08-16 PROCEDURE — 3008F BODY MASS INDEX DOCD: CPT | Mod: CPTII,S$GLB,, | Performed by: NURSE PRACTITIONER

## 2022-08-16 PROCEDURE — 99999 PR PBB SHADOW E&M-EST. PATIENT-LVL V: CPT | Mod: PBBFAC,,, | Performed by: NURSE PRACTITIONER

## 2022-08-16 PROCEDURE — 82962 POCT GLUCOSE, HAND-HELD DEVICE: ICD-10-PCS | Mod: S$GLB,,, | Performed by: NURSE PRACTITIONER

## 2022-08-16 PROCEDURE — 3288F FALL RISK ASSESSMENT DOCD: CPT | Mod: CPTII,S$GLB,, | Performed by: NURSE PRACTITIONER

## 2022-08-16 PROCEDURE — 1101F PT FALLS ASSESS-DOCD LE1/YR: CPT | Mod: CPTII,S$GLB,, | Performed by: NURSE PRACTITIONER

## 2022-08-16 PROCEDURE — 1159F PR MEDICATION LIST DOCUMENTED IN MEDICAL RECORD: ICD-10-PCS | Mod: CPTII,S$GLB,, | Performed by: NURSE PRACTITIONER

## 2022-08-16 PROCEDURE — 3288F PR FALLS RISK ASSESSMENT DOCUMENTED: ICD-10-PCS | Mod: CPTII,S$GLB,, | Performed by: NURSE PRACTITIONER

## 2022-08-16 RX ORDER — ROSUVASTATIN CALCIUM 10 MG/1
10 TABLET, COATED ORAL DAILY
COMMUNITY
Start: 2022-07-06

## 2022-08-16 RX ORDER — FLASH GLUCOSE SENSOR
1 KIT MISCELLANEOUS
Qty: 2 KIT | Refills: 11 | Status: SHIPPED | OUTPATIENT
Start: 2022-08-16 | End: 2022-09-06

## 2022-08-16 RX ORDER — LANOLIN ALCOHOL/MO/W.PET/CERES
CREAM (GRAM) TOPICAL
COMMUNITY
Start: 2022-07-26

## 2022-08-16 RX ORDER — ALBUTEROL SULFATE 90 UG/1
AEROSOL, METERED RESPIRATORY (INHALATION)
COMMUNITY
Start: 2022-07-26

## 2022-08-16 RX ORDER — EMPAGLIFLOZIN 25 MG/1
25 TABLET, FILM COATED ORAL DAILY
Qty: 90 TABLET | Refills: 3 | Status: SHIPPED | OUTPATIENT
Start: 2022-08-16 | End: 2023-02-21 | Stop reason: SDUPTHER

## 2022-08-16 RX ORDER — PANTOPRAZOLE SODIUM 20 MG/1
20 TABLET, DELAYED RELEASE ORAL EVERY MORNING
COMMUNITY
Start: 2022-08-08

## 2022-08-16 NOTE — PROGRESS NOTES
Subjective:         Patient ID: Bebe Sandhu is a 66 y.o. female.  Patient's current PCP is Haroldo Fernandes MD.     Chief Complaint: Diabetes Mellitus    HPI  Bebe Sandhu is a 66 y.o. Black or  female presenting for a follow up for diabetes. Patient has been diagnosed with type 2 diabetes since 10/2017 .    CURRENT DM MEDICATIONS:   Diabetes Medications             JARDIANCE 25 mg tablet Take 25 mg by mouth once daily.    metFORMIN (GLUCOPHAGE) 1000 MG tablet Take 1 tablet (1,000 mg total) by mouth 2 (two) times daily.    TRESIBA FLEXTOUCH U-200 200 unit/mL (3 mL) insulin pen INJECT 14 UNITS INTO THE SKIN DAILY        Past failed treatment include: Januvia d'c'd due to elevated lipase    Blood glucose testing is performed regularly. Patient is testing 3 times per day.  Meter:  True Metrix --Interested in Sigrid  Preferred lab: With PCP office- Dr. Fernandes    Any episodes of hypoglycemia? Denies    Complications related to diabetes: nephropathy, retinopathy and peripheral vascular disease    Her blood sugar in the clinic today was:   Lab Results   Component Value Date    POCGLU 101 08/16/2022     Bebe Sandhu presents today for follow up visit to discuss diabetes management.  Interested in Sigrid- understands not covered on her current insurance with one injection/day. BGs are stable when checked. She did complete recent lab work with PCP, however I do not have these results- requested at time of visit. Tolerating Jardiance well without  side effects and Metformin well without GI side effects. Denies any hypoglycemia and takes her insulin daily,faithfully.    Diagnosed with tremors- seeing physician with [facility]. Started B1.     CKD III- Sees Dr. Schroeder. Due for appt in Oct.  She will call to schedule this appt.    GI- Dr. Louis- History of elevated lipase-denies history of pancreatitis. Most recent liver enzymes normal. Pt states she was told she likely  had hepatitis A as a child.     Familial hyperlipidemia- Taking Zetia, Vascepa, and Crestor daily. Also sees cardiologist, Dr. Flores.     Sees Dr. Ragland - Followed for abdominal aneurysm.     Hypothyroidism- She takes Levothyroxine 50 mcg daily.     Vitamin D level -taking Vitamin D 5,000 International Units daily.    Current diet: Diabetic- has been eating more starches lately  Activity Level: no structured exercise    Requested recent labs from PCP.    Lab Results   Component Value Date    HGBA1C 5.3 06/02/2020     STANDARDS OF CARE  Diabetes Management Status    Statin: Taking  ACE/ARB: Taking    Screening or Prevention Patient's value Goal Complete/Controlled?   HgA1C Testing and Control   Lab Results   Component Value Date    HGBA1C 5.3 06/02/2020      Annually/Less than 8% No   Lipid profile : 06/02/2020 Annually No   LDL control No results found for: LDLCALC Annually/Less than 100 mg/dl  No   Nephropathy screening No results found for: LABMICR  Lab Results   Component Value Date    PROTEINUA Negative 09/23/2021     Lab Results   Component Value Date    UTPCR Unable to calculate 10/12/2020      Annually Yes   Blood pressure BP Readings from Last 1 Encounters:   08/16/22 110/74    Less than 140/90 Yes   Dilated retinal exam Most Recent Eye Exam Date: Not Found Annually No Signed consent to have records released from [facility] in [place]   Foot exam   : 08/16/2022 Annually Yes     Labs reviewed and are noted below. PCP-Care Everywhere- 12/14/2021        Lab Results   Component Value Date    WBC 10.59 09/23/2021    HGB 14.5 09/23/2021    HCT 45.5 09/23/2021     09/23/2021     09/23/2021    K 4.1 09/23/2021     09/23/2021    ANIONGAP 14 09/23/2021    CREATININE 1.0 09/23/2021    ESTGFRAFRICA >60.0 09/23/2021    EGFRNONAA 59.3 (A) 09/23/2021    BUN 14 09/23/2021    CO2 28 09/23/2021    GLU 86 09/23/2021    UTPCR Unable to calculate 10/12/2020     Lab Results   Component Value Date     PTH 41.0 08/13/2019    CALCIUM 9.5 09/23/2021    PHOS 3.1 09/23/2021     No results found for: CPEPTIDE  No results found for: GLUTAMICACID  Glucose   Date Value Ref Range Status   09/23/2021 86 70 - 110 mg/dL Final     Anion Gap   Date Value Ref Range Status   09/23/2021 14 8 - 16 mmol/L Final     eGFR if    Date Value Ref Range Status   09/23/2021 >60.0 >60 mL/min/1.73 m^2 Final     eGFR if non    Date Value Ref Range Status   09/23/2021 59.3 (A) >60 mL/min/1.73 m^2 Final     Comment:     Calculation used to obtain the estimated glomerular filtration  rate (eGFR) is the CKD-EPI equation.          The following portions of the patient's history were reviewed and updated as appropriate: allergies, current medications, past family history, past medical history, past social history, past surgical history and problem list.    Review of patient's allergies indicates:   Allergen Reactions    Sulfa (sulfonamide antibiotics) Rash     Social History     Socioeconomic History    Marital status:    Tobacco Use    Smoking status: Current Every Day Smoker     Packs/day: 1.00     Types: Cigarettes    Smokeless tobacco: Never Used   Substance and Sexual Activity    Alcohol use: No    Drug use: No    Sexual activity: Not Currently     Past Medical History:   Diagnosis Date    Anemia     Anxiety     Anxiety associated with depression     High cholesterol     Hypertension     Mental disorder        REVIEW OF SYSTEMS:  Eyes History of DR.  Cardiovascular: History of HTn and hyperlipidemia.  GI: History of elevated lipase in the past.  : History of nephropathy.  Neuro: No neuropathy.  PSYCH: Current tobacco use. Working on lessening.  ENDO: See HPI.        Objective:      Vitals:    08/16/22 1011   BP: 110/74   Pulse: 86     RESPIRATORY: No respiratory distress  NEUROLOGIC: Cranial nerves II-XII grossly intact.   PSYCHIATRIC: Alert & oriented x3. Normal mood and affect.  FOOT  EXAMINATION: Protective Sensation (w/ 10 gram monofilament):  Right: Intact  Left: Intact    Visual Inspection:  Normal -  Bilateral and Nails Intact - without Evidence of Foot Deformity- Bilateral    Pedal Pulses:   Right: Present  Left: Present    Assessment:       1. Controlled type 2 diabetes mellitus with stage 3 chronic kidney disease, with long-term current use of insulin    2. Hyperlipidemia associated with type 2 diabetes mellitus    3. Essential hypertension    4. Hypothyroidism, unspecified type    5. PVD (peripheral vascular disease)    6. Abdominal aneurysm    7. Vitamin D deficiency        Plan:   Bebe was seen today for diabetes mellitus.    Diagnoses and all orders for this visit:    Controlled type 2 diabetes mellitus with stage 3 chronic kidney disease, with long-term current use of insulin  -     POCT Glucose, Hand-Held Device  -     [employer]STYLE LAINEY 2 SENSOR Kit; 1 each by Misc.(Non-Drug; Combo Route) route every 14 (fourteen) days.  -     JARDIANCE 25 mg tablet; Take 1 tablet (25 mg total) by mouth once daily.    She will price out Lainey 2 to see if affordable on her own.    Message sent to PCP's office to have labs faxed for our records.    RAYRAY request signed to obtain eye exam.    Medications adjusted for today's visit include:    Continue Tresiba 14 units once a day.     Continue Metformin     Continue Jardiance to 25 mg daily.    Hyperlipidemia associated with type 2 diabetes mellitus-Chronic,stable    -Continue Crestor, Vascepa, and Zetia.    Essential hypertension-Chronic,stable    -BP is at goal. Continue current medications.    Hypothyroidism, unspecified type-Chronic,stable    -Continue Levothyroxine 50 mcg daily.    PVD (peripheral vascular disease)-Chronic,stable  Abdominal aneurysm-Chronic,stable    -Follow up with vascular specialist.    Vitamin D deficiency-Chronic,stable    -Continue Vit D 5,000 International Units daily.    - Follow up: 6 months    Portions of this note may have  "been created with voice recognition software. Occasional "wrong-word" or "sound-a-like" substitutions may have occurred due to the inherent limitations of voice recognition software. Please, read the note carefully and recognize, using context, where substitutions have occurred.             RAMYA Moses-MARYAM  [facility]  "

## 2022-08-17 ENCOUNTER — TELEPHONE (OUTPATIENT)
Dept: DIABETES | Facility: CLINIC | Age: 67
End: 2022-08-17
Payer: MEDICARE

## 2022-08-17 NOTE — TELEPHONE ENCOUNTER
----- Message from Viry Chou NP sent at 8/17/2022  9:05 AM CDT -----  Let patient now I did receive labs from Dr. Fernandes- richard, except Tg level remains over 300. Confirm she is taking Crestor, Zetia, and Vascepa 2 capsules twice daily?

## 2022-08-17 NOTE — TELEPHONE ENCOUNTER
----- Message from Sofia Parra sent at 8/17/2022  2:03 PM CDT -----  Contact: Bebe  .Type:  Patient Returning Call    Who Called: Bebe  Who Left Message for Patient: nurse   Does the patient know what this is regarding?: no  Would the patient rather a call back or a response via [facility]? call  Best Call Back Number:[phone] (home)    Additional Information: The patient returned the call and would like to be contacted again today please.

## 2023-02-21 ENCOUNTER — OFFICE VISIT (OUTPATIENT)
Dept: DIABETES | Facility: CLINIC | Age: 68
End: 2023-02-21
Payer: MEDICARE

## 2023-02-21 VITALS
HEART RATE: 90 BPM | DIASTOLIC BLOOD PRESSURE: 85 MMHG | SYSTOLIC BLOOD PRESSURE: 131 MMHG | RESPIRATION RATE: 18 BRPM | WEIGHT: 172.63 LBS | BODY MASS INDEX: 33.89 KG/M2 | HEIGHT: 60 IN

## 2023-02-21 DIAGNOSIS — I71.40 ABDOMINAL ANEURYSM: ICD-10-CM

## 2023-02-21 DIAGNOSIS — E11.69 HYPERLIPIDEMIA ASSOCIATED WITH TYPE 2 DIABETES MELLITUS: ICD-10-CM

## 2023-02-21 DIAGNOSIS — E11.22 CONTROLLED TYPE 2 DIABETES MELLITUS WITH STAGE 3 CHRONIC KIDNEY DISEASE, WITH LONG-TERM CURRENT USE OF INSULIN: Primary | ICD-10-CM

## 2023-02-21 DIAGNOSIS — I10 ESSENTIAL HYPERTENSION: ICD-10-CM

## 2023-02-21 DIAGNOSIS — I73.9 PVD (PERIPHERAL VASCULAR DISEASE): ICD-10-CM

## 2023-02-21 DIAGNOSIS — E03.9 HYPOTHYROIDISM, UNSPECIFIED TYPE: ICD-10-CM

## 2023-02-21 DIAGNOSIS — N18.30 CONTROLLED TYPE 2 DIABETES MELLITUS WITH STAGE 3 CHRONIC KIDNEY DISEASE, WITH LONG-TERM CURRENT USE OF INSULIN: Primary | ICD-10-CM

## 2023-02-21 DIAGNOSIS — E78.5 HYPERLIPIDEMIA ASSOCIATED WITH TYPE 2 DIABETES MELLITUS: ICD-10-CM

## 2023-02-21 DIAGNOSIS — Z79.4 CONTROLLED TYPE 2 DIABETES MELLITUS WITH STAGE 3 CHRONIC KIDNEY DISEASE, WITH LONG-TERM CURRENT USE OF INSULIN: Primary | ICD-10-CM

## 2023-02-21 DIAGNOSIS — E55.9 VITAMIN D DEFICIENCY: ICD-10-CM

## 2023-02-21 LAB — GLUCOSE SERPL-MCNC: 96 MG/DL (ref 70–110)

## 2023-02-21 PROCEDURE — 3075F SYST BP GE 130 - 139MM HG: CPT | Mod: CPTII,S$GLB,, | Performed by: NURSE PRACTITIONER

## 2023-02-21 PROCEDURE — 99214 PR OFFICE/OUTPT VISIT, EST, LEVL IV, 30-39 MIN: ICD-10-PCS | Mod: S$GLB,,, | Performed by: NURSE PRACTITIONER

## 2023-02-21 PROCEDURE — 3079F DIAST BP 80-89 MM HG: CPT | Mod: CPTII,S$GLB,, | Performed by: NURSE PRACTITIONER

## 2023-02-21 PROCEDURE — 3288F FALL RISK ASSESSMENT DOCD: CPT | Mod: CPTII,S$GLB,, | Performed by: NURSE PRACTITIONER

## 2023-02-21 PROCEDURE — 3008F BODY MASS INDEX DOCD: CPT | Mod: CPTII,S$GLB,, | Performed by: NURSE PRACTITIONER

## 2023-02-21 PROCEDURE — 3288F PR FALLS RISK ASSESSMENT DOCUMENTED: ICD-10-PCS | Mod: CPTII,S$GLB,, | Performed by: NURSE PRACTITIONER

## 2023-02-21 PROCEDURE — 95251 CONT GLUC MNTR ANALYSIS I&R: CPT | Mod: S$GLB,,, | Performed by: NURSE PRACTITIONER

## 2023-02-21 PROCEDURE — 99999 PR PBB SHADOW E&M-EST. PATIENT-LVL III: ICD-10-PCS | Mod: PBBFAC,,, | Performed by: NURSE PRACTITIONER

## 2023-02-21 PROCEDURE — 3079F PR MOST RECENT DIASTOLIC BLOOD PRESSURE 80-89 MM HG: ICD-10-PCS | Mod: CPTII,S$GLB,, | Performed by: NURSE PRACTITIONER

## 2023-02-21 PROCEDURE — 99214 OFFICE O/P EST MOD 30 MIN: CPT | Mod: S$GLB,,, | Performed by: NURSE PRACTITIONER

## 2023-02-21 PROCEDURE — 3075F PR MOST RECENT SYSTOLIC BLOOD PRESS GE 130-139MM HG: ICD-10-PCS | Mod: CPTII,S$GLB,, | Performed by: NURSE PRACTITIONER

## 2023-02-21 PROCEDURE — 4010F ACE/ARB THERAPY RXD/TAKEN: CPT | Mod: CPTII,S$GLB,, | Performed by: NURSE PRACTITIONER

## 2023-02-21 PROCEDURE — 1160F PR REVIEW ALL MEDS BY PRESCRIBER/CLIN PHARMACIST DOCUMENTED: ICD-10-PCS | Mod: CPTII,S$GLB,, | Performed by: NURSE PRACTITIONER

## 2023-02-21 PROCEDURE — 3008F PR BODY MASS INDEX (BMI) DOCUMENTED: ICD-10-PCS | Mod: CPTII,S$GLB,, | Performed by: NURSE PRACTITIONER

## 2023-02-21 PROCEDURE — 95251 PR GLUCOSE MONITOR, 72 HOUR, PHYS INTERP: ICD-10-PCS | Mod: S$GLB,,, | Performed by: NURSE PRACTITIONER

## 2023-02-21 PROCEDURE — 1126F AMNT PAIN NOTED NONE PRSNT: CPT | Mod: CPTII,S$GLB,, | Performed by: NURSE PRACTITIONER

## 2023-02-21 PROCEDURE — 1159F MED LIST DOCD IN RCRD: CPT | Mod: CPTII,S$GLB,, | Performed by: NURSE PRACTITIONER

## 2023-02-21 PROCEDURE — 99999 PR PBB SHADOW E&M-EST. PATIENT-LVL III: CPT | Mod: PBBFAC,,, | Performed by: NURSE PRACTITIONER

## 2023-02-21 PROCEDURE — 82962 GLUCOSE BLOOD TEST: CPT | Mod: S$GLB,,, | Performed by: NURSE PRACTITIONER

## 2023-02-21 PROCEDURE — 82962 POCT GLUCOSE, HAND-HELD DEVICE: ICD-10-PCS | Mod: S$GLB,,, | Performed by: NURSE PRACTITIONER

## 2023-02-21 PROCEDURE — 1126F PR PAIN SEVERITY QUANTIFIED, NO PAIN PRESENT: ICD-10-PCS | Mod: CPTII,S$GLB,, | Performed by: NURSE PRACTITIONER

## 2023-02-21 PROCEDURE — 1101F PR PT FALLS ASSESS DOC 0-1 FALLS W/OUT INJ PAST YR: ICD-10-PCS | Mod: CPTII,S$GLB,, | Performed by: NURSE PRACTITIONER

## 2023-02-21 PROCEDURE — 1101F PT FALLS ASSESS-DOCD LE1/YR: CPT | Mod: CPTII,S$GLB,, | Performed by: NURSE PRACTITIONER

## 2023-02-21 PROCEDURE — 1160F RVW MEDS BY RX/DR IN RCRD: CPT | Mod: CPTII,S$GLB,, | Performed by: NURSE PRACTITIONER

## 2023-02-21 PROCEDURE — 4010F PR ACE/ARB THEARPY RXD/TAKEN: ICD-10-PCS | Mod: CPTII,S$GLB,, | Performed by: NURSE PRACTITIONER

## 2023-02-21 PROCEDURE — 1159F PR MEDICATION LIST DOCUMENTED IN MEDICAL RECORD: ICD-10-PCS | Mod: CPTII,S$GLB,, | Performed by: NURSE PRACTITIONER

## 2023-02-21 RX ORDER — INSULIN DEGLUDEC 200 U/ML
INJECTION, SOLUTION SUBCUTANEOUS
Qty: 9 PEN | Refills: 3 | Status: SHIPPED | OUTPATIENT
Start: 2023-02-21 | End: 2023-10-03

## 2023-02-21 RX ORDER — PEN NEEDLE, DIABETIC 30 GX3/16"
NEEDLE, DISPOSABLE MISCELLANEOUS
Qty: 100 EACH | Refills: 3 | Status: SHIPPED | OUTPATIENT
Start: 2023-02-21 | End: 2024-03-12 | Stop reason: SDUPTHER

## 2023-02-21 RX ORDER — FLASH GLUCOSE SENSOR
KIT MISCELLANEOUS
Qty: 6 KIT | Refills: 3 | Status: SHIPPED | OUTPATIENT
Start: 2023-02-21

## 2023-02-21 RX ORDER — IPRATROPIUM BROMIDE AND ALBUTEROL SULFATE 2.5; .5 MG/3ML; MG/3ML
SOLUTION RESPIRATORY (INHALATION)
COMMUNITY
Start: 2022-12-22

## 2023-02-21 RX ORDER — METFORMIN HYDROCHLORIDE 1000 MG/1
1000 TABLET ORAL 2 TIMES DAILY
Qty: 180 TABLET | Refills: 3 | Status: SHIPPED | OUTPATIENT
Start: 2023-02-21 | End: 2024-02-22 | Stop reason: SDUPTHER

## 2023-02-21 RX ORDER — ALENDRONATE SODIUM 70 MG/1
70 TABLET ORAL
COMMUNITY
Start: 2023-01-17

## 2023-02-21 RX ORDER — EMPAGLIFLOZIN 25 MG/1
25 TABLET, FILM COATED ORAL DAILY
Qty: 90 TABLET | Refills: 3 | Status: SHIPPED | OUTPATIENT
Start: 2023-02-21 | End: 2023-11-20 | Stop reason: SDUPTHER

## 2023-02-21 NOTE — PROGRESS NOTES
Subjective:         Patient ID: Bebe Sandhu is a 67 y.o. female.  Patient's current PCP is Haroldo Fernandes MD.     Chief Complaint: Diabetes Mellitus    HPI  Bebe Sandhu is a 67 y.o. Black or  female presenting for a follow up for diabetes. Patient has been diagnosed with type 2 diabetes since 10/2017 .    CURRENT DM MEDICATIONS:   Diabetes Medications               insulin degludec (TRESIBA FLEXTOUCH U-200) 200 unit/mL (3 mL) insulin pen INJECT 14 UNITS SUBCUTANEOUSLY DAILY (BULK)    JARDIANCE 25 mg tablet Take 1 tablet (25 mg total) by mouth once daily.    metFORMIN (GLUCOPHAGE) 1000 MG tablet Take 1 tablet (1,000 mg total) by mouth 2 (two) times daily.           Past failed treatment include: Januvia d'c'd due to elevated lipase    Blood glucose testing is performed regularly. Patient is testing 3 times per day.  Meter:  Continuous per [facility] ([facility] mail order)  Preferred lab: With PCP office- Dr. Fernandes    Any episodes of hypoglycemia? Denies    Complications related to diabetes: nephropathy, retinopathy and peripheral vascular disease    Her blood sugar in the clinic today was:   Lab Results   Component Value Date    POCGLU 96 02/21/2023     Bebe Sandhu presents today for follow up visit to discuss diabetes management.  Reports had recent labs- requested from PCP at time of visit.  Per [facility] download, for the last 14 days: Average glucose of 112 mg/dL and 96% in range. 4% mildly elevated with no readings > 250. No hypoglycemia. Estimated GMI 6.0%. Seldom glucose spike noted- nothing consistent enough to warrant adjustment. Based on review, plan to continue medications as current. She is tolerating meds well without side effects.       CKD III- Sees Dr. Schroeder.     GI- Dr. Louis- History of elevated lipase-denies history of pancreatitis. Most recent liver enzymes normal. Pt states she was told she likely had hepatitis A as a child.     Familial  hyperlipidemia- Taking Zetia, Vascepa, and Crestor daily. Also sees cardiologist, Dr. Flores.     Sees Dr. Ragland - Followed for abdominal aneurysm.     Hypothyroidism- She takes Levothyroxine 50 mcg daily.     Vitamin D level -taking Vitamin D 5,000 International Units daily.    Current diet: Diabetic  Activity Level: no structured exercise    Requested recent labs from PCP.    Lab Results   Component Value Date    HGBA1C 5.3 06/02/2020     STANDARDS OF CARE  Diabetes Management Status    Statin: Taking  ACE/ARB: Taking    Screening or Prevention Patient's value Goal Complete/Controlled?   HgA1C Testing and Control   Lab Results   Component Value Date    HGBA1C 5.3 06/02/2020      Annually/Less than 8% No     Lipid profile : 06/02/2020 Annually No     LDL control No results found for: LDLCALC Annually/Less than 100 mg/dl  No     Nephropathy screening No results found for: LABMICR  Lab Results   Component Value Date    PROTEINUA Negative 09/23/2021     Lab Results   Component Value Date    UTPCR Unable to calculate 10/12/2020      Annually No     Blood pressure BP Readings from Last 1 Encounters:   02/21/23 131/85    Less than 140/90 Yes     Dilated retinal exam Most Recent Eye Exam Date: Not Found Annually No [facility] in [place] - Dr. Painter- No DR- 2022  RAYRAY signed     Foot exam   : 08/16/2022 Annually Yes        Labs reviewed and are noted below.     Lab Results   Component Value Date    WBC 10.59 09/23/2021    HGB 14.5 09/23/2021    HCT 45.5 09/23/2021     09/23/2021     09/23/2021    K 4.1 09/23/2021     09/23/2021    ANIONGAP 14 09/23/2021    CREATININE 1.0 09/23/2021    ESTGFRAFRICA >60.0 09/23/2021    EGFRNONAA 59.3 (A) 09/23/2021    BUN 14 09/23/2021    CO2 28 09/23/2021    GLU 86 09/23/2021    UTPCR Unable to calculate 10/12/2020     Lab Results   Component Value Date    PTH 41.0 08/13/2019    CALCIUM 9.5 09/23/2021    PHOS 3.1 09/23/2021     No results found for:  CPEPTIDE  No results found for: GLUTAMICACID  Glucose   Date Value Ref Range Status   09/23/2021 86 70 - 110 mg/dL Final     Anion Gap   Date Value Ref Range Status   09/23/2021 14 8 - 16 mmol/L Final     eGFR if    Date Value Ref Range Status   09/23/2021 >60.0 >60 mL/min/1.73 m^2 Final     eGFR if non    Date Value Ref Range Status   09/23/2021 59.3 (A) >60 mL/min/1.73 m^2 Final     Comment:     Calculation used to obtain the estimated glomerular filtration  rate (eGFR) is the CKD-EPI equation.          The following portions of the patient's history were reviewed and updated as appropriate: allergies, current medications, past family history, past medical history, past social history, past surgical history and problem list.    Review of patient's allergies indicates:   Allergen Reactions    Sulfa (sulfonamide antibiotics) Rash     Social History     Socioeconomic History    Marital status:    Tobacco Use    Smoking status: Every Day     Packs/day: 1.00     Types: Cigarettes    Smokeless tobacco: Never   Substance and Sexual Activity    Alcohol use: No    Drug use: No    Sexual activity: Not Currently     Past Medical History:   Diagnosis Date    Anemia     Anxiety     Anxiety associated with depression     High cholesterol     Hypertension     Mental disorder      REVIEW OF SYSTEMS:  Eyes History of DR.  Cardiovascular: History of HTN and hyperlipidemia.  GI: History of elevated lipase in the past.  : History of nephropathy.  Neuro: No neuropathy.  PSYCH: Current tobacco use.   ENDO: See HPI.        Objective:      Vitals:    02/21/23 1014   BP: 131/85   Pulse: 90   Resp: 18     RESPIRATORY: No respiratory distress  NEUROLOGIC: Cranial nerves II-XII grossly intact.   PSYCHIATRIC: Alert & oriented x3. Normal mood and affect.  FOOT EXAMINATION: UTD    Assessment:       1. Controlled type 2 diabetes mellitus with stage 3 chronic kidney disease, with long-term current use of  "insulin    2. Hyperlipidemia associated with type 2 diabetes mellitus    3. Essential hypertension    4. Hypothyroidism, unspecified type    5. PVD (peripheral vascular disease)    6. Abdominal aneurysm    7. Vitamin D deficiency        Plan:   Bebe was seen today for diabetes mellitus.    Diagnoses and all orders for this visit:    Controlled type 2 diabetes mellitus with stage 3 chronic kidney disease, with long-term current use of insulin  -     POCT Glucose, Hand-Held Device  -     flash glucose sensor (FREESTYLE LAINEY 2 SENSOR) Kit; Change sensor every 14 days  -     JARDIANCE 25 mg tablet; Take 1 tablet (25 mg total) by mouth once daily.  -     metFORMIN (GLUCOPHAGE) 1000 MG tablet; Take 1 tablet (1,000 mg total) by mouth 2 (two) times daily.  -     pen needle, diabetic (BD ULTRA-FINE IAN PEN NEEDLE) 32 gauge x 5/32" Ndle; USE with tresiba DAILY (BULK)  -     insulin degludec (TRESIBA FLEXTOUCH U-200) 200 unit/mL (3 mL) insulin pen; INJECT 14 UNITS SUBCUTANEOUSLY DAILY (BULK)    Medications adjusted for today's visit include:    Continue Tresiba 14 units once a day.     Continue Metformin     Continue Jardiance to 25 mg daily.    Continuous glucose monitoring report:    The patient's CGM was downloaded and was reviewed for the last 14 days. See HPI for interpretation & plan.     Hyperlipidemia associated with type 2 diabetes mellitus-Chronic,stable    -Continue Crestor, Vascepa, and Zetia.    Essential hypertension-Chronic,stable    -BP is at goal. Continue current medications.    Hypothyroidism, unspecified type-Chronic,stable    -Continue Levothyroxine 50 mcg daily.    PVD (peripheral vascular disease)-Chronic,stable  Abdominal aneurysm-Chronic,stable    -Follow up with vascular specialist.    Vitamin D deficiency-Chronic,stable    -Continue Vit D 5,000 International Units daily.    - Follow up:  6 months    Portions of this note may have been created with voice recognition software. Occasional "wrong-word" " "or "sound-a-like" substitutions may have occurred due to the inherent limitations of voice recognition software. Please, read the note carefully and recognize, using context, where substitutions have occurred.             BELA Moses  [facility]  "

## 2023-02-22 ENCOUNTER — TELEPHONE (OUTPATIENT)
Dept: DIABETES | Facility: CLINIC | Age: 68
End: 2023-02-22
Payer: MEDICARE

## 2023-02-22 NOTE — TELEPHONE ENCOUNTER
Received lab report from Dr. Fernandes (CMP, Lipid profile, CBC, Vit D, UA and Thyroid studies) Scanned into media.

## 2023-02-27 ENCOUNTER — PATIENT OUTREACH (OUTPATIENT)
Dept: ADMINISTRATIVE | Facility: HOSPITAL | Age: 68
End: 2023-02-27
Payer: MEDICARE

## 2023-02-27 NOTE — PROGRESS NOTES
Uploaded RAYRAY DM EYE EXAM 2/21/2023 ; faxed to [facility] [place] [facility]x to request. Remind me set 1 week.

## 2023-03-06 NOTE — PROGRESS NOTES
2nd attempt  RAYRAY DM EYE EXAM 2/21/2023 ; faxed to [facility] [place] [facility]x to request. Remind me set 1 week.

## 2023-03-09 ENCOUNTER — OFFICE VISIT (OUTPATIENT)
Dept: PULMONOLOGY | Facility: CLINIC | Age: 68
End: 2023-03-09
Payer: MEDICARE

## 2023-03-09 VITALS
WEIGHT: 173.31 LBS | BODY MASS INDEX: 34.02 KG/M2 | RESPIRATION RATE: 22 BRPM | OXYGEN SATURATION: 94 % | DIASTOLIC BLOOD PRESSURE: 82 MMHG | HEIGHT: 60 IN | SYSTOLIC BLOOD PRESSURE: 128 MMHG | HEART RATE: 91 BPM

## 2023-03-09 DIAGNOSIS — Z23 NEED FOR IMMUNIZATION AGAINST INFLUENZA: ICD-10-CM

## 2023-03-09 DIAGNOSIS — R49.0 HOARSENESS: ICD-10-CM

## 2023-03-09 DIAGNOSIS — J44.9 COPD, MODERATE: ICD-10-CM

## 2023-03-09 DIAGNOSIS — F17.210 SMOKING GREATER THAN 30 PACK YEARS: ICD-10-CM

## 2023-03-09 DIAGNOSIS — Z23 NEED FOR VACCINATION FOR PNEUMOCOCCUS: Primary | ICD-10-CM

## 2023-03-09 PROCEDURE — 1160F RVW MEDS BY RX/DR IN RCRD: CPT | Mod: CPTII,S$GLB,, | Performed by: INTERNAL MEDICINE

## 2023-03-09 PROCEDURE — 99999 PR PBB SHADOW E&M-EST. PATIENT-LVL V: CPT | Mod: PBBFAC,,, | Performed by: INTERNAL MEDICINE

## 2023-03-09 PROCEDURE — 4010F PR ACE/ARB THEARPY RXD/TAKEN: ICD-10-PCS | Mod: CPTII,S$GLB,, | Performed by: INTERNAL MEDICINE

## 2023-03-09 PROCEDURE — 1159F PR MEDICATION LIST DOCUMENTED IN MEDICAL RECORD: ICD-10-PCS | Mod: CPTII,S$GLB,, | Performed by: INTERNAL MEDICINE

## 2023-03-09 PROCEDURE — 3074F PR MOST RECENT SYSTOLIC BLOOD PRESSURE < 130 MM HG: ICD-10-PCS | Mod: CPTII,S$GLB,, | Performed by: INTERNAL MEDICINE

## 2023-03-09 PROCEDURE — 3079F PR MOST RECENT DIASTOLIC BLOOD PRESSURE 80-89 MM HG: ICD-10-PCS | Mod: CPTII,S$GLB,, | Performed by: INTERNAL MEDICINE

## 2023-03-09 PROCEDURE — 1101F PR PT FALLS ASSESS DOC 0-1 FALLS W/OUT INJ PAST YR: ICD-10-PCS | Mod: CPTII,S$GLB,, | Performed by: INTERNAL MEDICINE

## 2023-03-09 PROCEDURE — 1160F PR REVIEW ALL MEDS BY PRESCRIBER/CLIN PHARMACIST DOCUMENTED: ICD-10-PCS | Mod: CPTII,S$GLB,, | Performed by: INTERNAL MEDICINE

## 2023-03-09 PROCEDURE — 3079F DIAST BP 80-89 MM HG: CPT | Mod: CPTII,S$GLB,, | Performed by: INTERNAL MEDICINE

## 2023-03-09 PROCEDURE — 3008F BODY MASS INDEX DOCD: CPT | Mod: CPTII,S$GLB,, | Performed by: INTERNAL MEDICINE

## 2023-03-09 PROCEDURE — 3288F FALL RISK ASSESSMENT DOCD: CPT | Mod: CPTII,S$GLB,, | Performed by: INTERNAL MEDICINE

## 2023-03-09 PROCEDURE — 3008F PR BODY MASS INDEX (BMI) DOCUMENTED: ICD-10-PCS | Mod: CPTII,S$GLB,, | Performed by: INTERNAL MEDICINE

## 2023-03-09 PROCEDURE — 4010F ACE/ARB THERAPY RXD/TAKEN: CPT | Mod: CPTII,S$GLB,, | Performed by: INTERNAL MEDICINE

## 2023-03-09 PROCEDURE — 99999 PR PBB SHADOW E&M-EST. PATIENT-LVL V: ICD-10-PCS | Mod: PBBFAC,,, | Performed by: INTERNAL MEDICINE

## 2023-03-09 PROCEDURE — 3288F PR FALLS RISK ASSESSMENT DOCUMENTED: ICD-10-PCS | Mod: CPTII,S$GLB,, | Performed by: INTERNAL MEDICINE

## 2023-03-09 PROCEDURE — 99204 PR OFFICE/OUTPT VISIT, NEW, LEVL IV, 45-59 MIN: ICD-10-PCS | Mod: S$GLB,,, | Performed by: INTERNAL MEDICINE

## 2023-03-09 PROCEDURE — 99204 OFFICE O/P NEW MOD 45 MIN: CPT | Mod: S$GLB,,, | Performed by: INTERNAL MEDICINE

## 2023-03-09 PROCEDURE — 1101F PT FALLS ASSESS-DOCD LE1/YR: CPT | Mod: CPTII,S$GLB,, | Performed by: INTERNAL MEDICINE

## 2023-03-09 PROCEDURE — 3074F SYST BP LT 130 MM HG: CPT | Mod: CPTII,S$GLB,, | Performed by: INTERNAL MEDICINE

## 2023-03-09 PROCEDURE — 1159F MED LIST DOCD IN RCRD: CPT | Mod: CPTII,S$GLB,, | Performed by: INTERNAL MEDICINE

## 2023-03-09 RX ORDER — ROSUVASTATIN CALCIUM 20 MG/1
20 TABLET, COATED ORAL
COMMUNITY
Start: 2023-02-16

## 2023-03-09 NOTE — PROGRESS NOTES
Initial Outpatient Pulmonary Evaluation       SUBJECTIVE:     Chief Complaint   Patient presents with    COPD    Shortness of Breath       History of Present Illness:    Patient is a 67 y.o. female daily smoker, more than 30 pack year presenting to establish care for COPD.      On albuterol p.r.n. Anoro 1 puff daily.  Recent pneumonia Kyl[place] .  On O2 during sleep and on exertion.  Making effort to quit smoking with nicotine replacement and Wellbutrin.    Previously was seeing pulmonology at [facility] last seen Dr. Andrea Jaquez.      Known with moderately severe COPD as per PFT in 2018.  Low-dose CT scan done [facility] reviewed.  .          Review of Systems   Constitutional:  Positive for fatigue. Negative for fever and chills.   HENT:  Positive for voice change. Negative for nosebleeds.    Eyes:  Negative for redness.   Respiratory:  Positive for cough, shortness of breath, dyspnea on extertion and use of rescue inhaler. Negative for choking.    Cardiovascular:  Negative for chest pain.   Genitourinary:  Negative for hematuria.   Endocrine:  Negative for cold intolerance.    Musculoskeletal:  Positive for arthralgias.   Gastrointestinal:  Negative for vomiting.   Neurological:  Negative for syncope.   Hematological:  Negative for adenopathy.   Psychiatric/Behavioral:  Negative for confusion.      Review of patient's allergies indicates:   Allergen Reactions    Sulfa (sulfonamide antibiotics) Rash       Current Outpatient Medications   Medication Sig Dispense Refill    albuterol (PROVENTIL/VENTOLIN HFA) 90 mcg/actuation inhaler SMARTSI Puff(s) Via Inhaler Every 6 Hours PRN      albuterol-ipratropium (DUO-NEB) 2.5 mg-0.5 mg/3 mL nebulizer solution Take by nebulization.      alendronate (FOSAMAX) 70 MG tablet Take 70 mg by mouth every 7 days.      alprazolam (XANAX) 1 MG tablet Take 1 mg by mouth 2 (two) times daily.      amLODIPine (NORVASC)  "5 MG tablet Take 2.5 mg by mouth.      ANORO ELLIPTA 62.5-25 mcg/actuation DsDv Inhale 1 puff into the lungs once daily.      aspirin 81 MG Chew Take 81 mg by mouth once daily.      cholecalciferol, vitamin D3, 125 mcg (5,000 unit) Tab Take 1 tablet by mouth.      citalopram (CELEXA) 20 MG tablet Take 20 mg by mouth every morning.  1    cloNIDine (CATAPRES) 0.2 MG tablet Take 0.2 mg by mouth once.      famotidine (PEPCID) 20 MG tablet Take 20 mg by mouth nightly as needed for Heartburn.      ferrous sulfate (FEOSOL) 325 mg (65 mg iron) Tab tablet 1 tablet      flash glucose sensor (FREESTYLE LAINEY 2 SENSOR) Kit Change sensor every 14 days 6 kit 3    furosemide (LASIX) 20 MG tablet Take 20 mg by mouth once daily.      hydrochlorothiazide (HYDRODIURIL) 25 MG tablet Take 25 mg by mouth once daily.      icosapent ethyl 1 gram Cap Take 2 g by mouth.      insulin degludec (TRESIBA FLEXTOUCH U-200) 200 unit/mL (3 mL) insulin pen INJECT 14 UNITS SUBCUTANEOUSLY DAILY (BULK) 9 pen 3    JARDIANCE 25 mg tablet Take 1 tablet (25 mg total) by mouth once daily. 90 tablet 3    levothyroxine (SYNTHROID) 50 MCG tablet Take 50 mcg by mouth.      loratadine (CLARITIN) 10 mg tablet Take 10 mg by mouth once daily.      meloxicam (MOBIC) 7.5 MG tablet Take 7.5 mg by mouth once daily.      metFORMIN (GLUCOPHAGE) 1000 MG tablet Take 1 tablet (1,000 mg total) by mouth 2 (two) times daily. 180 tablet 3    mirtazapine (REMERON) 7.5 MG Tab Take 7.5 mg by mouth.      nicotine (NICODERM CQ) 14 mg/24 hr Place 1 patch onto the skin once daily. 14 patch 0    nicotine polacrilex 4 MG Lozg Take 4 mg by mouth as needed.      pantoprazole (PROTONIX) 20 MG tablet Take 20 mg by mouth every morning.      pen needle, diabetic (BD ULTRA-FINE IAN PEN NEEDLE) 32 gauge x 5/32" Ndle USE with tresiba DAILY (BULK) 100 each 3    potassium chloride SA (K-DUR,KLOR-CON) 20 MEQ tablet Take 20 mEq by mouth once daily.      quinapril (ACCUPRIL) 20 MG tablet Take 20 mg " by mouth every evening.      rosuvastatin (CRESTOR) 10 MG tablet Take 10 mg by mouth once daily.      rosuvastatin (CRESTOR) 20 MG tablet Take 20 mg by mouth.      thiamine 100 MG tablet Take by mouth.      venlafaxine (EFFEXOR-XR) 75 MG 24 hr capsule Take 75 mg by mouth once daily.      vitamin E 600 UNIT capsule Take 1 capsule by mouth once daily.      ezetimibe (ZETIA) 10 mg tablet Take 10 mg by mouth.      flu vac 2022 [id number],PF, 60 mcg (15 mcg x 4)/0.5 mL Syrg Inject 0.5 mLs into the muscle once. for 1 dose 0.5 mL 0    folic acid (FOLVITE) 1 MG tablet Take 1 mg by mouth.      levalbuterol (XOPENEX HFA) 45 mcg/actuation inhaler Inhale 2 puffs into the lungs every 4 (four) hours as needed.      pneumoc 20-reena conj-dip cr,PF, (PREVNAR 20, PF,) 0.5 mL Syrg injection Inject 0.5 ml into the muscle. 0.5 mL 0     No current facility-administered medications for this visit.       Past Medical History:   Diagnosis Date    Anemia     Anxiety     Anxiety associated with depression     High cholesterol     Hypertension     Mental disorder      Past Surgical History:   Procedure Laterality Date    CARPAL TUNNEL RELEASE      carpr       Family History   Problem Relation Age of Onset    Hypertension Father     Stroke Father     Ovarian cancer Mother     Breast cancer Cousin     Cancer Son         non Hodgskin lymphoma    Colon cancer Maternal Aunt     Diabetes Neg Hx      Social History     Tobacco Use    Smoking status: Every Day     Packs/day: 1.00     Types: Cigarettes    Smokeless tobacco: Never   Substance Use Topics    Alcohol use: No    Drug use: No          OBJECTIVE:     Vital Signs (Most Recent)  Vital Signs  Pulse: 91  Resp: (!) 22  SpO2: (!) 94 %  BP: 128/82  Height and Weight  Height: 5' (152.4 cm)  Weight: 78.6 kg (173 lb 4.5 oz)  BSA (Calculated - sq m): 1.82 sq meters  BMI (Calculated): 33.8  Weight in (lb) to have BMI = 25: 127.7]  Wt Readings from Last 2 Encounters:   03/09/23 78.6 kg (173 lb 4.5 oz)    02/21/23 78.3 kg (172 lb 9.9 oz)         Physical Exam:  Physical Exam   Constitutional: She is oriented to person, place, and time. She appears well-developed. No distress.   HENT:   Head: Normocephalic.   Cardiovascular: Normal rate.   Pulmonary/Chest: Normal expansion. No stridor. No respiratory distress. She exhibits no tenderness.   Abdominal: She exhibits no distension.   Musculoskeletal:         General: No tenderness.      Cervical back: Neck supple.   Lymphadenopathy:     She has no cervical adenopathy.   Neurological: She is alert and oriented to person, place, and time. Gait normal.   Skin: Skin is warm.   Psychiatric: She has a normal mood and affect. Her behavior is normal. Judgment and thought content normal.   Nursing note and vitals reviewed.    Laboratory  Lab Results   Component Value Date    WBC 10.59 09/23/2021    RBC 4.90 09/23/2021    HGB 14.5 09/23/2021    HCT 45.5 09/23/2021    MCV 93 09/23/2021    MCH 29.6 09/23/2021    MCHC 31.9 (L) 09/23/2021    RDW 13.1 09/23/2021     09/23/2021    MPV 9.6 09/23/2021    GRAN 7.6 09/23/2021    GRAN 72.1 09/23/2021    LYMPH 2.3 09/23/2021    LYMPH 21.4 09/23/2021    MONO 0.4 09/23/2021    MONO 3.9 (L) 09/23/2021    EOS 0.1 09/23/2021    BASO 0.10 09/23/2021    EOSINOPHIL 1.2 09/23/2021    BASOPHIL 0.9 09/23/2021       BMP  Lab Results   Component Value Date     09/23/2021    K 4.1 09/23/2021     09/23/2021    CO2 28 09/23/2021    BUN 14 09/23/2021    CREATININE 1.0 09/23/2021    CALCIUM 9.5 09/23/2021    ANIONGAP 14 09/23/2021    ESTGFRAFRICA >60.0 09/23/2021    EGFRNONAA 59.3 (A) 09/23/2021       No results found for: BNP    No results found for: TSH    No results found for: SEDRATE    No results found for: CRP    No results found for: IGE    No results found for: ASPERGILLUS  No results found for: AFUMIGATUSCL     No results found for: ACE  PFT 2018     Opinion:   Airflow Limitation Present. FEV1 53%, Absolute FEV1/FVC <70 and FEF  25-75%   29%. Low FEF 25-75% can be seen in early COPD and asthma.     No Restriction.       Elevated RV/TLC Pleth (133%) suggests mild air trapping.       LOW DLCO (37%) that does not correct to alveolar volume, DL/VA ( 51%)   suggests an intrinsic  process such as anemia, fibrosis or loss of   functional alveolar units.       Normal Dilator response present. FEV1 50 ml/ 3%.     Abnormal FVL consistent with mild airflow obstruction and intermittent   flattening of the inspiratory limb.                    Diagnostic Results:  I have personally reviewed today the following studies :      LDCT 11/2022  Lung-RADS 2 - Benign Appearance or Behavior:  Continue annual screening with LDCT in 12 months      ASSESSMENT/PLAN:     Need for vaccination for pneumococcus  -     (In Office Administered) Pneumococcal Conjugate Vaccine (20 Valent) (IM)    Need for immunization against influenza    Hoarseness  -     Ambulatory referral/consult to ENT; Future; Expected date: 03/16/2023    COPD, moderate  -     Stress test, pulmonary; Future  -     Complete PFT without bronchodilator; Future    Smoking greater than 30 pack years  -     CT Chest Lung Screening Low Dose; Future; Expected date: 11/22/2023        Re-evaluate need for O2 on exertion with 6 minute walking test.      Check PFT.      Continue albuterol p.r.n. and Anoro 1 puff daily.      Low-dose CT scan of the chest due November 2023.      Counseled 5 minute about smoking cessation.      Referred to ENT for evaluation of hoarseness and voice changes.    Patient will obtain her vaccines in the pharmacy today.    O2 AT NIGHT + BOTTLES pna 2022 XMAS         Follow up in about 3 months (around 6/9/2023).    This note was prepared using voice recognition system and is likely to have sound alike errors that may have been overlooked even after proof reading.  Please call me with any questions    Discussed diagnosis, its evaluation, treatment and usual course. All questions  answered.    Thank you for the courtesy of participating in the care of this patient    Mini Styles MD

## 2023-03-13 NOTE — PROGRESS NOTES
Called and spoke with Velma who states that she will gave the staff that handles medical records call me back. Phone number provided. Remind me set 1 week.

## 2023-03-20 NOTE — PROGRESS NOTES
3rd attempt  RAYRAY DM EYE EXAM 2/21/2023 ; faxed to [facility] [place] 1x to request. Remind me set 1 week.

## 2023-03-23 ENCOUNTER — OFFICE VISIT (OUTPATIENT)
Dept: OTOLARYNGOLOGY | Facility: CLINIC | Age: 68
End: 2023-03-23
Payer: MEDICARE

## 2023-03-23 VITALS — WEIGHT: 173.06 LBS | TEMPERATURE: 98 F | BODY MASS INDEX: 33.98 KG/M2 | HEIGHT: 60 IN

## 2023-03-23 DIAGNOSIS — R49.0 HOARSENESS: ICD-10-CM

## 2023-03-23 DIAGNOSIS — J38.1 POLYP OF LARYNX: Primary | ICD-10-CM

## 2023-03-23 PROCEDURE — 1101F PR PT FALLS ASSESS DOC 0-1 FALLS W/OUT INJ PAST YR: ICD-10-PCS | Mod: CPTII,S$GLB,, | Performed by: OTOLARYNGOLOGY

## 2023-03-23 PROCEDURE — 1160F RVW MEDS BY RX/DR IN RCRD: CPT | Mod: CPTII,S$GLB,, | Performed by: OTOLARYNGOLOGY

## 2023-03-23 PROCEDURE — 99202 PR OFFICE/OUTPT VISIT, NEW, LEVL II, 15-29 MIN: ICD-10-PCS | Mod: 25,S$GLB,, | Performed by: OTOLARYNGOLOGY

## 2023-03-23 PROCEDURE — 3288F FALL RISK ASSESSMENT DOCD: CPT | Mod: CPTII,S$GLB,, | Performed by: OTOLARYNGOLOGY

## 2023-03-23 PROCEDURE — 3008F BODY MASS INDEX DOCD: CPT | Mod: CPTII,S$GLB,, | Performed by: OTOLARYNGOLOGY

## 2023-03-23 PROCEDURE — 4010F ACE/ARB THERAPY RXD/TAKEN: CPT | Mod: CPTII,S$GLB,, | Performed by: OTOLARYNGOLOGY

## 2023-03-23 PROCEDURE — 99202 OFFICE O/P NEW SF 15 MIN: CPT | Mod: 25,S$GLB,, | Performed by: OTOLARYNGOLOGY

## 2023-03-23 PROCEDURE — 1126F PR PAIN SEVERITY QUANTIFIED, NO PAIN PRESENT: ICD-10-PCS | Mod: CPTII,S$GLB,, | Performed by: OTOLARYNGOLOGY

## 2023-03-23 PROCEDURE — 3288F PR FALLS RISK ASSESSMENT DOCUMENTED: ICD-10-PCS | Mod: CPTII,S$GLB,, | Performed by: OTOLARYNGOLOGY

## 2023-03-23 PROCEDURE — 1159F MED LIST DOCD IN RCRD: CPT | Mod: CPTII,S$GLB,, | Performed by: OTOLARYNGOLOGY

## 2023-03-23 PROCEDURE — 1126F AMNT PAIN NOTED NONE PRSNT: CPT | Mod: CPTII,S$GLB,, | Performed by: OTOLARYNGOLOGY

## 2023-03-23 PROCEDURE — 31575 PR LARYNGOSCOPY, FLEXIBLE; DIAGNOSTIC: ICD-10-PCS | Mod: S$GLB,,, | Performed by: OTOLARYNGOLOGY

## 2023-03-23 PROCEDURE — 99999 PR PBB SHADOW E&M-EST. PATIENT-LVL V: CPT | Mod: PBBFAC,,, | Performed by: OTOLARYNGOLOGY

## 2023-03-23 PROCEDURE — 4010F PR ACE/ARB THEARPY RXD/TAKEN: ICD-10-PCS | Mod: CPTII,S$GLB,, | Performed by: OTOLARYNGOLOGY

## 2023-03-23 PROCEDURE — 99999 PR PBB SHADOW E&M-EST. PATIENT-LVL V: ICD-10-PCS | Mod: PBBFAC,,, | Performed by: OTOLARYNGOLOGY

## 2023-03-23 PROCEDURE — 3008F PR BODY MASS INDEX (BMI) DOCUMENTED: ICD-10-PCS | Mod: CPTII,S$GLB,, | Performed by: OTOLARYNGOLOGY

## 2023-03-23 PROCEDURE — 31575 DIAGNOSTIC LARYNGOSCOPY: CPT | Mod: S$GLB,,, | Performed by: OTOLARYNGOLOGY

## 2023-03-23 PROCEDURE — 1101F PT FALLS ASSESS-DOCD LE1/YR: CPT | Mod: CPTII,S$GLB,, | Performed by: OTOLARYNGOLOGY

## 2023-03-23 PROCEDURE — 1159F PR MEDICATION LIST DOCUMENTED IN MEDICAL RECORD: ICD-10-PCS | Mod: CPTII,S$GLB,, | Performed by: OTOLARYNGOLOGY

## 2023-03-23 PROCEDURE — 1160F PR REVIEW ALL MEDS BY PRESCRIBER/CLIN PHARMACIST DOCUMENTED: ICD-10-PCS | Mod: CPTII,S$GLB,, | Performed by: OTOLARYNGOLOGY

## 2023-03-23 NOTE — PROGRESS NOTES
"      SUBJECTIVE:    HPI: Bebe is seeing me today for evaluation of hoarseness that has been present for years. She does smoke but is trying to quit. She denies difficulty swallowing or breathing. In the past, she was having trouble getting "choked," but had an esophageal dilation, which helped.  She does have a history of reflux and takes  Pepcid and Protonix  (maybe-she is unsure about Pepcid). The hoarseness does not affect her quality of life. No aggravating or alleviating factors.           OBJECTIVE:  Physical Exam  HENT:      Nose: Nose normal.      Mouth/Throat:      Pharynx: Oropharynx is clear. Uvula midline.   Musculoskeletal:      Cervical back: Neck supple.   Lymphadenopathy:      Cervical: No cervical adenopathy.       Due to indication in patient's history, presentation or risk factors, a fiberoptic exam was performed.     SEPARATE PROCEDURE NOTE:    ANESTHESIA:  Topical lidocaine with jamie-synephrine    FINDINGS:  normal tvfs, small polypoid structure noted at esophageal inlet    PROCEDURE:  After verbal consent was obtained, the flexible scope was passed through the patient's nasal cavity without difficulty.  The nasopharynx (adenoid pad) and eustachian tube orifices were first visualized and were found to be normal, without masses or irregularity.  The posterior pharyngeal wall and base of tongue were then examined and no mass or irregular tissue was seen.  The scope was then advanced to the larynx, and the epiglottis, valleculae, and piriform sinuses were normal, without masses or mucosal irregularity.  The false vocal folds and true vocal folds were then examined and were found to have normal mobility (full abduction and adduction) and no masses or mucosal irregularity was seen.  Some redundancy was noted in the interarytenoid area. There was a small, polypoid structure noted at the esophageal inlet.           ASSESSMENT:    Encounter Diagnoses   Name Primary?    Hoarseness     Polyp of larynx Yes "         PLAN:   Reviewed exam findings with patient. This polypoid area appears benign, but given her smoking history I will let Dr. Cobb take a look to see if it is something that warrants biopsy. Vocal hygiene measures given. Encouraged smoking cessation. Continue reflux meds and follow up as needed.

## 2023-03-23 NOTE — PATIENT INSTRUCTIONS
VOCAL HYGIENE AND HYDRATION RECOMMENDATIONS     DO   Drink plenty of waterabout  oz a day! If your urine is pale, you should be well-hydrated.  Try some of these tips to increase hydration as well.  Eat wet snacks such as grapes, melon, cucumbers, apple slices   Reduce intake coffee and other caffeinated and carbonated beverages   Suck on pastille lozenges or sugar free candies (not mentholated lozenges)   Use a room or personal humidifier   Use sinus rinses/irrigations or nasal saline spray   Inhale steam for a few minutes before speaking or singing   Pay attention to how, and how much, you use your voice.   Give yourself vocal breaks throughout the day.   Breathe when talking, take frequent pauses for breath.   Use a microphone when speaking in front of a group of 15 or more to reduce voice strain.   Learn how to use your voice correctly to get loud with voice therapy techniques.  Stay healthy. Eat right and get plenty of rest. Follow a reflux precaution diet if indicated.   ____________________________________________________________________    REDUCE   Loud talking, yelling, cheering, or screaming. Voice injury can take place over a long time, or all at once.  If you need to talk loud or yell, be sure you are doing it properly.   Clearing your throat and forceful coughing. The vocal folds collect mucus when irritated or inflamed and this can cause the urge to clear your throat. The trauma of clearing the throat creates more inflammation and mucus. See tips above for keeping hydrated to assist with cutting back on throat clearing.  Instead of clearing your throat, try these behaviors until the sensation passes:   Take a sip of water   Silently clear with a hard exhale making an hhh sound   Swallow hard   Drying agents such as anti-histamines or decongestant medications. You should always take medications as prescribed, but keep in mind these will dry you out, so increase your hydration!    ____________________________________________________________________    AVOID   Inhalant irritants such as smoke, strong fumes, and allergens. Take advantage of 1-800-QUIT-NOW if you are ready to stop smoking.   Unnecessary non-speech noises, such as grunting during exercise, loud noises, or excessively high- or low-pitched sounds. Save your voice for talking and singing!   Whispering. Whispering places your vocal folds in a tenser position than usual.

## 2023-03-27 NOTE — PROGRESS NOTES
Called and spoke with patient to inform of failed attempts at obtaining records. Patient states that she will  the records and bring them in to [facility] when she comes for her scan on 4/6/2023.

## 2023-03-28 ENCOUNTER — PATIENT MESSAGE (OUTPATIENT)
Dept: OTOLARYNGOLOGY | Facility: CLINIC | Age: 68
End: 2023-03-28
Payer: MEDICARE

## 2023-03-28 ENCOUNTER — TELEPHONE (OUTPATIENT)
Dept: OTOLARYNGOLOGY | Facility: CLINIC | Age: 68
End: 2023-03-28
Payer: MEDICARE

## 2023-03-28 DIAGNOSIS — K21.9 LARYNGOPHARYNGEAL REFLUX (LPR): Primary | ICD-10-CM

## 2023-04-06 ENCOUNTER — CLINICAL SUPPORT (OUTPATIENT)
Dept: PULMONOLOGY | Facility: CLINIC | Age: 68
End: 2023-04-06
Payer: MEDICARE

## 2023-04-06 VITALS — HEIGHT: 60 IN | WEIGHT: 176.13 LBS | BODY MASS INDEX: 34.58 KG/M2

## 2023-04-06 DIAGNOSIS — J44.9 COPD, MODERATE: ICD-10-CM

## 2023-04-06 LAB
BRPFT: ABNORMAL
DLCO ADJ PRE: 9.77 ML/(MIN*MMHG) (ref 13.41–24.88)
DLCO SINGLE BREATH LLN: 13.41
DLCO SINGLE BREATH PRE REF: 51.1 %
DLCO SINGLE BREATH REF: 19.14
DLCOC SBVA LLN: 2.8
DLCOC SBVA PRE REF: 75.4 %
DLCOC SBVA REF: 4.51
DLCOC SINGLE BREATH LLN: 13.41
DLCOC SINGLE BREATH PRE REF: 51.1 %
DLCOC SINGLE BREATH REF: 19.14
DLCOVA LLN: 2.8
DLCOVA PRE REF: 75.4 %
DLCOVA PRE: 3.4 ML/(MIN*MMHG*L) (ref 2.8–6.23)
DLCOVA REF: 4.51
DLVAADJ PRE: 3.4 ML/(MIN*MMHG*L) (ref 2.8–6.23)
ERV LLN: -16449.35
ERV PRE REF: 50.4 %
ERV REF: 0.65
FEF 25 75 LLN: 0.85
FEF 25 75 PRE REF: 20.1 %
FEF 25 75 REF: 1.79
FEV1 FVC LLN: 66
FEV1 FVC PRE REF: 68.3 %
FEV1 FVC REF: 79
FEV1 LLN: 1.46
FEV1 PRE REF: 54.3 %
FEV1 REF: 1.98
FRCPLETH LLN: 1.65
FRCPLETH PREREF: 125.5 %
FRCPLETH REF: 2.47
FVC LLN: 1.86
FVC PRE REF: 79 %
FVC REF: 2.52
IVC PRE: 1.68 L (ref 1.86–3.18)
IVC SINGLE BREATH LLN: 1.86
IVC SINGLE BREATH PRE REF: 66.8 %
IVC SINGLE BREATH REF: 2.52
MVV LLN: 59
MVV PRE REF: 66.5 %
MVV REF: 69
PEF LLN: 3.75
PEF PRE REF: 69.1 %
PEF REF: 5.24
PRE DLCO: 9.77 ML/(MIN*MMHG) (ref 13.41–24.88)
PRE ERV: 0.33 L (ref -16449.35–16450.65)
PRE FEF 25 75: 0.36 L/S (ref 0.85–2.73)
PRE FET 100: 13.43 SEC
PRE FEV1 FVC: 54.02 % (ref 66.02–92.04)
PRE FEV1: 1.08 L (ref 1.46–2.51)
PRE FRC PL: 3.1 L
PRE FVC: 1.99 L (ref 1.86–3.18)
PRE MVV: 46 L/MIN (ref 58.79–79.53)
PRE PEF: 3.62 L/S (ref 3.75–6.74)
PRE RV: 2.6 L (ref 1.25–2.4)
PRE TLC: 4.59 L (ref 3.25–5.23)
RAW LLN: 3.06
RAW PRE REF: 149.1 %
RAW PRE: 4.56 CMH2O*S/L (ref 3.06–3.06)
RAW REF: 3.06
RV LLN: 1.25
RV PRE REF: 142.6 %
RV REF: 1.82
RVTLC LLN: 32
RVTLC PRE REF: 135.6 %
RVTLC PRE: 56.61 % (ref 32.15–51.33)
RVTLC REF: 42
TLC LLN: 3.25
TLC PRE REF: 108.2 %
TLC REF: 4.24
VA PRE: 2.87 L (ref 4.09–4.09)
VA SINGLE BREATH LLN: 4.09
VA SINGLE BREATH PRE REF: 70.2 %
VA SINGLE BREATH REF: 4.09
VC LLN: 1.86
VC PRE REF: 79 %
VC PRE: 1.99 L (ref 1.86–3.18)
VC REF: 2.52
VTGRAWPRE: 3.51 L

## 2023-04-06 PROCEDURE — 94010 BREATHING CAPACITY TEST: ICD-10-PCS | Mod: S$GLB,,, | Performed by: INTERNAL MEDICINE

## 2023-04-06 PROCEDURE — 94618 PULMONARY STRESS TESTING: ICD-10-PCS | Mod: S$GLB,,, | Performed by: INTERNAL MEDICINE

## 2023-04-06 PROCEDURE — 94729 DIFFUSING CAPACITY: CPT | Mod: S$GLB,,, | Performed by: INTERNAL MEDICINE

## 2023-04-06 PROCEDURE — 94726 PLETHYSMOGRAPHY LUNG VOLUMES: CPT | Mod: S$GLB,,, | Performed by: INTERNAL MEDICINE

## 2023-04-06 PROCEDURE — 94726 PULM FUNCT TST PLETHYSMOGRAP: ICD-10-PCS | Mod: S$GLB,,, | Performed by: INTERNAL MEDICINE

## 2023-04-06 PROCEDURE — 94618 PULMONARY STRESS TESTING: CPT | Mod: S$GLB,,, | Performed by: INTERNAL MEDICINE

## 2023-04-06 PROCEDURE — 94010 BREATHING CAPACITY TEST: CPT | Mod: S$GLB,,, | Performed by: INTERNAL MEDICINE

## 2023-04-06 PROCEDURE — 94729 PR C02/MEMBANE DIFFUSE CAPACITY: ICD-10-PCS | Mod: S$GLB,,, | Performed by: INTERNAL MEDICINE

## 2023-04-06 NOTE — PROCEDURES
[facility] - Pulmonary Function  Six Minute Walk     SUMMARY     Ordering Provider: MD Stephani   Interpreting Provider: MD Stephani  Performing nurse/tech/RT: JOCY Nagy CRT  Diagnosis: COPD (Moderate)  Height: 5' (152.4 cm)  Weight: 79.9 kg (176 lb 2.4 oz)  BMI (Calculated): 34.4   Patient Race:             Phase Oxygen Assessment Supplemental O2 Heart   Rate Blood Pressure Manda Dyspnea Scale Rating   Resting 94 % Room Air 96 bpm 129/74 0   Exercise        Minute        1 91 % Room Air 114 bpm     2 92 % Room Air 114 bpm     3 92 % Room Air 120 bpm     4 91 % Room Air 121 bpm     5 92 % Room Air 122 bpm     6  91 % Room Air 122 bpm 186/84 4   Recovery        Minute        1 93 % Room Air 115 bpm     2 96 % Room Air 103 bpm     3 96 % Room Air 103 bpm     4 95 % Room Air 99 bpm 129/70 3     Six Minute Walk Summary  6MWT Status: completed without stopping  Patient Reported: Dyspnea, Other (Comment) (Hip pain)     Interpretation:  Did the patient stop or pause?: No                                         Total Time Walked (Calculated): 360 seconds  Final Partial Lap Distance (feet): 0 feet  Total Distance Meters (Calculated): 426.72 meters  Predicted Distance Meters (Calculated): 418.33 meters  Percentage of Predicted (Calculated): 102.01  Peak VO2 (Calculated): 16.78  Mets: 4.79  Has The Patient Had a Previous Six Minute Walk Test?: No       Previous 6MWT Results  Has The Patient Had a Previous Six Minute Walk Test?: No

## 2023-04-19 ENCOUNTER — PATIENT MESSAGE (OUTPATIENT)
Dept: GASTROENTEROLOGY | Facility: CLINIC | Age: 68
End: 2023-04-19
Payer: MEDICARE

## 2023-06-08 ENCOUNTER — OFFICE VISIT (OUTPATIENT)
Dept: PULMONOLOGY | Facility: CLINIC | Age: 68
End: 2023-06-08
Payer: MEDICARE

## 2023-06-08 VITALS
WEIGHT: 182.56 LBS | DIASTOLIC BLOOD PRESSURE: 50 MMHG | SYSTOLIC BLOOD PRESSURE: 110 MMHG | RESPIRATION RATE: 20 BRPM | OXYGEN SATURATION: 89 % | BODY MASS INDEX: 34.47 KG/M2 | HEIGHT: 61 IN | HEART RATE: 84 BPM

## 2023-06-08 DIAGNOSIS — F17.210 SMOKING GREATER THAN 30 PACK YEARS: ICD-10-CM

## 2023-06-08 DIAGNOSIS — J44.9 COPD, MODERATE: Primary | ICD-10-CM

## 2023-06-08 DIAGNOSIS — R49.0 HOARSENESS: ICD-10-CM

## 2023-06-08 DIAGNOSIS — J38.1 VOCAL CORD POLYP: ICD-10-CM

## 2023-06-08 PROCEDURE — 4010F ACE/ARB THERAPY RXD/TAKEN: CPT | Mod: CPTII,S$GLB,, | Performed by: INTERNAL MEDICINE

## 2023-06-08 PROCEDURE — 1125F AMNT PAIN NOTED PAIN PRSNT: CPT | Mod: CPTII,S$GLB,, | Performed by: INTERNAL MEDICINE

## 2023-06-08 PROCEDURE — 1125F PR PAIN SEVERITY QUANTIFIED, PAIN PRESENT: ICD-10-PCS | Mod: CPTII,S$GLB,, | Performed by: INTERNAL MEDICINE

## 2023-06-08 PROCEDURE — 3288F FALL RISK ASSESSMENT DOCD: CPT | Mod: CPTII,S$GLB,, | Performed by: INTERNAL MEDICINE

## 2023-06-08 PROCEDURE — 3074F SYST BP LT 130 MM HG: CPT | Mod: CPTII,S$GLB,, | Performed by: INTERNAL MEDICINE

## 2023-06-08 PROCEDURE — 3288F PR FALLS RISK ASSESSMENT DOCUMENTED: ICD-10-PCS | Mod: CPTII,S$GLB,, | Performed by: INTERNAL MEDICINE

## 2023-06-08 PROCEDURE — 3078F PR MOST RECENT DIASTOLIC BLOOD PRESSURE < 80 MM HG: ICD-10-PCS | Mod: CPTII,S$GLB,, | Performed by: INTERNAL MEDICINE

## 2023-06-08 PROCEDURE — 1101F PT FALLS ASSESS-DOCD LE1/YR: CPT | Mod: CPTII,S$GLB,, | Performed by: INTERNAL MEDICINE

## 2023-06-08 PROCEDURE — 3078F DIAST BP <80 MM HG: CPT | Mod: CPTII,S$GLB,, | Performed by: INTERNAL MEDICINE

## 2023-06-08 PROCEDURE — 1101F PR PT FALLS ASSESS DOC 0-1 FALLS W/OUT INJ PAST YR: ICD-10-PCS | Mod: CPTII,S$GLB,, | Performed by: INTERNAL MEDICINE

## 2023-06-08 PROCEDURE — 1159F MED LIST DOCD IN RCRD: CPT | Mod: CPTII,S$GLB,, | Performed by: INTERNAL MEDICINE

## 2023-06-08 PROCEDURE — 4010F PR ACE/ARB THEARPY RXD/TAKEN: ICD-10-PCS | Mod: CPTII,S$GLB,, | Performed by: INTERNAL MEDICINE

## 2023-06-08 PROCEDURE — 99214 OFFICE O/P EST MOD 30 MIN: CPT | Mod: S$GLB,,, | Performed by: INTERNAL MEDICINE

## 2023-06-08 PROCEDURE — 99999 PR PBB SHADOW E&M-EST. PATIENT-LVL V: ICD-10-PCS | Mod: PBBFAC,,, | Performed by: INTERNAL MEDICINE

## 2023-06-08 PROCEDURE — 99999 PR PBB SHADOW E&M-EST. PATIENT-LVL V: CPT | Mod: PBBFAC,,, | Performed by: INTERNAL MEDICINE

## 2023-06-08 PROCEDURE — 3008F PR BODY MASS INDEX (BMI) DOCUMENTED: ICD-10-PCS | Mod: CPTII,S$GLB,, | Performed by: INTERNAL MEDICINE

## 2023-06-08 PROCEDURE — 3008F BODY MASS INDEX DOCD: CPT | Mod: CPTII,S$GLB,, | Performed by: INTERNAL MEDICINE

## 2023-06-08 PROCEDURE — 99214 PR OFFICE/OUTPT VISIT, EST, LEVL IV, 30-39 MIN: ICD-10-PCS | Mod: S$GLB,,, | Performed by: INTERNAL MEDICINE

## 2023-06-08 PROCEDURE — 1159F PR MEDICATION LIST DOCUMENTED IN MEDICAL RECORD: ICD-10-PCS | Mod: CPTII,S$GLB,, | Performed by: INTERNAL MEDICINE

## 2023-06-08 PROCEDURE — 3074F PR MOST RECENT SYSTOLIC BLOOD PRESSURE < 130 MM HG: ICD-10-PCS | Mod: CPTII,S$GLB,, | Performed by: INTERNAL MEDICINE

## 2023-06-08 NOTE — PROGRESS NOTES
Pulmonary Outpatient Follow Up Visit     Subjective:       Patient ID: Bebe Sandhu is a 67 y.o. female.    Chief Complaint: COPD      HPI        Patient is a 67 y.o. female presenting for 3 months follow-up.      2023 smoking few cigarettes.  On nicotine patches.  Follow-up with smoking cessation.  Continuing to make effort to quit smoking.      On albuterol p.r.n. and Anoro 1 puff daily.      Moderately severe COPD noted on spirometry.      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      Initially evaluated 2023 and she is known with  more than 30 pack year     On albuterol p.r.n. Anoro 1 puff daily.  Recent pneumonia Kylah 2022.  On O2 during sleep and on exertion.  Making effort to quit smoking with nicotine replacement and Wellbutrin.    Previously was seeing pulmonology at [facility] last seen Dr. Andrea Jaquez.      Known with moderately severe COPD as per PFT in 2018.  Low-dose CT scan done [facility] reviewed.  .        Review of Systems   Constitutional:  Positive for fatigue. Negative for fever and chills.   HENT:  Positive for voice change. Negative for nosebleeds.    Eyes:  Negative for redness.   Respiratory:  Positive for cough, shortness of breath, dyspnea on extertion and use of rescue inhaler. Negative for choking.    Cardiovascular:  Negative for chest pain.   Genitourinary:  Negative for hematuria.   Endocrine:  Negative for cold intolerance.    Musculoskeletal:  Positive for arthralgias.   Gastrointestinal:  Negative for vomiting.   Neurological:  Negative for syncope.   Hematological:  Negative for adenopathy.   Psychiatric/Behavioral:  Negative for confusion.      Outpatient Encounter Medications as of 2023   Medication Sig Dispense Refill    albuterol (PROVENTIL/VENTOLIN HFA) 90 mcg/actuation inhaler SMARTSI Puff(s) Via Inhaler Every 6 Hours PRN       albuterol-ipratropium (DUO-NEB) 2.5 mg-0.5 mg/3 mL nebulizer solution Take by nebulization.      alendronate (FOSAMAX) 70 MG tablet Take 70 mg by mouth every 7 days.      alprazolam (XANAX) 1 MG tablet Take 1 mg by mouth 2 (two) times daily.      amLODIPine (NORVASC) 5 MG tablet Take 2.5 mg by mouth.      ANORO ELLIPTA 62.5-25 mcg/actuation DsDv Inhale 1 puff into the lungs once daily.      aspirin 81 MG Chew Take 81 mg by mouth once daily.      cholecalciferol, vitamin D3, 125 mcg (5,000 unit) Tab Take 1 tablet by mouth.      citalopram (CELEXA) 20 MG tablet Take 20 mg by mouth every morning.  1    cloNIDine (CATAPRES) 0.2 MG tablet Take 0.2 mg by mouth once.      famotidine (PEPCID) 20 MG tablet Take 20 mg by mouth nightly as needed for Heartburn.      ferrous sulfate (FEOSOL) 325 mg (65 mg iron) Tab tablet 1 tablet      flash glucose sensor (FREESTYLE LAINEY 2 SENSOR) Kit Change sensor every 14 days 6 kit 3    furosemide (LASIX) 20 MG tablet Take 20 mg by mouth once daily.      hydrochlorothiazide (HYDRODIURIL) 25 MG tablet Take 25 mg by mouth once daily.      icosapent ethyl 1 gram Cap Take 2 g by mouth.      insulin degludec (TRESIBA FLEXTOUCH U-200) 200 unit/mL (3 mL) insulin pen INJECT 14 UNITS SUBCUTANEOUSLY DAILY (BULK) 9 pen 3    JARDIANCE 25 mg tablet Take 1 tablet (25 mg total) by mouth once daily. 90 tablet 3    levothyroxine (SYNTHROID) 50 MCG tablet Take 50 mcg by mouth.      loratadine (CLARITIN) 10 mg tablet Take 10 mg by mouth once daily.      meloxicam (MOBIC) 7.5 MG tablet Take 7.5 mg by mouth once daily.      metFORMIN (GLUCOPHAGE) 1000 MG tablet Take 1 tablet (1,000 mg total) by mouth 2 (two) times daily. 180 tablet 3    mirtazapine (REMERON) 7.5 MG Tab Take 7.5 mg by mouth.      nicotine (NICODERM CQ) 14 mg/24 hr Place 1 patch onto the skin once daily. 14 patch 0    nicotine polacrilex 4 MG Lozg Take 4 mg by mouth as needed.      pantoprazole (PROTONIX) 20 MG tablet Take 20 mg by mouth every  "morning.      pen needle, diabetic (BD ULTRA-FINE IAN PEN NEEDLE) 32 gauge x 5/32" Ndle USE with tresiba DAILY (BULK) 100 each 3    pneumoc 20-reena conj-dip cr,PF, (PREVNAR 20, PF,) 0.5 mL Syrg injection Inject 0.5 ml into the muscle. 0.5 mL 0    potassium chloride SA (K-DUR,KLOR-CON) 20 MEQ tablet Take 20 mEq by mouth once daily.      quinapril (ACCUPRIL) 20 MG tablet Take 20 mg by mouth every evening.      rosuvastatin (CRESTOR) 10 MG tablet Take 10 mg by mouth once daily.      rosuvastatin (CRESTOR) 20 MG tablet Take 20 mg by mouth.      thiamine 100 MG tablet Take by mouth.      venlafaxine (EFFEXOR-XR) 75 MG 24 hr capsule Take 75 mg by mouth once daily.      vitamin E 600 UNIT capsule Take 1 capsule by mouth once daily.      ezetimibe (ZETIA) 10 mg tablet Take 10 mg by mouth.      folic acid (FOLVITE) 1 MG tablet Take 1 mg by mouth.      levalbuterol (XOPENEX HFA) 45 mcg/actuation inhaler Inhale 2 puffs into the lungs every 4 (four) hours as needed.       No facility-administered encounter medications on file as of 6/8/2023.       Objective:     Vital Signs (Most Recent)  Vital Signs  Pulse: 84  Resp: 20  SpO2: (!) 89 %  BP: (!) 110/50  BP Location: Left arm  Patient Position: Sitting  Pain Score:   4  Pain Loc: Back  Height and Weight  Height: 5' 1" (154.9 cm)  Weight: 82.8 kg (182 lb 8.7 oz)  BSA (Calculated - sq m): 1.89 sq meters  BMI (Calculated): 34.5  Weight in (lb) to have BMI = 25: 132]  Wt Readings from Last 2 Encounters:   06/08/23 82.8 kg (182 lb 8.7 oz)   04/06/23 79.9 kg (176 lb 2.4 oz)       Physical Exam   Constitutional: She is oriented to person, place, and time. She appears well-developed. No distress.   HENT:   Head: Normocephalic.   Cardiovascular: Normal rate.   Pulmonary/Chest: Normal expansion and effort normal. No stridor. No respiratory distress. She has decreased breath sounds. She exhibits no tenderness.   Abdominal: She exhibits no distension.   Musculoskeletal:         General: No " tenderness.      Cervical back: Neck supple.   Lymphadenopathy:     She has no cervical adenopathy.   Neurological: She is alert and oriented to person, place, and time. Gait normal.   Skin: Skin is warm.   Psychiatric: She has a normal mood and affect. Her behavior is normal. Judgment and thought content normal.   Nursing note and vitals reviewed.    Laboratory  Lab Results   Component Value Date    WBC 10.59 09/23/2021    RBC 4.90 09/23/2021    HGB 14.5 09/23/2021    HCT 45.5 09/23/2021    MCV 93 09/23/2021    MCH 29.6 09/23/2021    MCHC 31.9 (L) 09/23/2021    RDW 13.1 09/23/2021     09/23/2021    MPV 9.6 09/23/2021    GRAN 7.6 09/23/2021    GRAN 72.1 09/23/2021    LYMPH 2.3 09/23/2021    LYMPH 21.4 09/23/2021    MONO 0.4 09/23/2021    MONO 3.9 (L) 09/23/2021    EOS 0.1 09/23/2021    BASO 0.10 09/23/2021    EOSINOPHIL 1.2 09/23/2021    BASOPHIL 0.9 09/23/2021       BMP  Lab Results   Component Value Date     09/23/2021    K 4.1 09/23/2021     09/23/2021    CO2 28 09/23/2021    BUN 14 09/23/2021    CREATININE 1.0 09/23/2021    CALCIUM 9.5 09/23/2021    ANIONGAP 14 09/23/2021    ESTGFRAFRICA >60.0 09/23/2021    EGFRNONAA 59.3 (A) 09/23/2021       No results found for: BNP    No results found for: TSH    No results found for: SEDRATE    No results found for: CRP  No results found for: IGE     No results found for: ASPERGILLUS  No results found for: AFUMIGATUSCL     No results found for: ACE   PFT 2018      Opinion:   Airflow Limitation Present. FEV1 53%, Absolute FEV1/FVC <70 and FEF 25-75%   29%. Low FEF 25-75% can be seen in early COPD and asthma.     No Restriction.       Elevated RV/TLC Pleth (133%) suggests mild air trapping.       LOW DLCO (37%) that does not correct to alveolar volume, DL/VA ( 51%)   suggests an intrinsic  process such as anemia, fibrosis or loss of   functional alveolar units.       Normal Dilator response present. FEV1 50 ml/ 3%.     Abnormal FVL consistent with mild  airflow obstruction and intermittent   flattening of the inspiratory limb.                     Diagnostic Results:  I have personally reviewed today the following studies:    LDCT 11/2022  Lung-RADS 2 - Benign Appearance or Behavior:  Continue annual screening with LDCT in 12 months        Assessment/Plan:   COPD, moderate    Smoking greater than 30 pack years    Hoarseness    Vocal cord polyp        Yearly low-dose CT scan of the chest November 2023.      Continue albuterol p.r.n. continue Anoro 1 puff daily.      Encouraged patient to continue smoking cessation effort.  Continue nicotine patches.      Laryngeal polyp awaiting evaluation by ENT Dr. De La Cruz for possible tissue biopsy.        Follow up in about 6 months (around 12/8/2023).    This note was prepared using voice recognition system and is likely to have sound alike errors that may have been overlooked even after proof reading.  Please call me with any questions    Discussed diagnosis, its evaluation, treatment and usual course. All questions answered.      Mini Styles MD

## 2023-06-26 ENCOUNTER — OFFICE VISIT (OUTPATIENT)
Dept: OTOLARYNGOLOGY | Facility: CLINIC | Age: 68
End: 2023-06-26
Payer: MEDICARE

## 2023-06-26 VITALS — TEMPERATURE: 99 F | HEIGHT: 61 IN | WEIGHT: 181.44 LBS | BODY MASS INDEX: 34.26 KG/M2

## 2023-06-26 DIAGNOSIS — R49.0 HOARSENESS: ICD-10-CM

## 2023-06-26 DIAGNOSIS — K21.9 LARYNGOPHARYNGEAL REFLUX (LPR): Primary | ICD-10-CM

## 2023-06-26 DIAGNOSIS — J38.1 POLYP OF LARYNX: ICD-10-CM

## 2023-06-26 PROCEDURE — 3288F PR FALLS RISK ASSESSMENT DOCUMENTED: ICD-10-PCS | Mod: CPTII,S$GLB,, | Performed by: OTOLARYNGOLOGY

## 2023-06-26 PROCEDURE — 99999 PR PBB SHADOW E&M-EST. PATIENT-LVL IV: ICD-10-PCS | Mod: PBBFAC,,, | Performed by: OTOLARYNGOLOGY

## 2023-06-26 PROCEDURE — 4010F PR ACE/ARB THEARPY RXD/TAKEN: ICD-10-PCS | Mod: CPTII,S$GLB,, | Performed by: OTOLARYNGOLOGY

## 2023-06-26 PROCEDURE — 99999 PR PBB SHADOW E&M-EST. PATIENT-LVL IV: CPT | Mod: PBBFAC,,, | Performed by: OTOLARYNGOLOGY

## 2023-06-26 PROCEDURE — 1101F PR PT FALLS ASSESS DOC 0-1 FALLS W/OUT INJ PAST YR: ICD-10-PCS | Mod: CPTII,S$GLB,, | Performed by: OTOLARYNGOLOGY

## 2023-06-26 PROCEDURE — 3288F FALL RISK ASSESSMENT DOCD: CPT | Mod: CPTII,S$GLB,, | Performed by: OTOLARYNGOLOGY

## 2023-06-26 PROCEDURE — 3008F BODY MASS INDEX DOCD: CPT | Mod: CPTII,S$GLB,, | Performed by: OTOLARYNGOLOGY

## 2023-06-26 PROCEDURE — 4010F ACE/ARB THERAPY RXD/TAKEN: CPT | Mod: CPTII,S$GLB,, | Performed by: OTOLARYNGOLOGY

## 2023-06-26 PROCEDURE — 3008F PR BODY MASS INDEX (BMI) DOCUMENTED: ICD-10-PCS | Mod: CPTII,S$GLB,, | Performed by: OTOLARYNGOLOGY

## 2023-06-26 PROCEDURE — 31575 DIAGNOSTIC LARYNGOSCOPY: CPT | Mod: S$GLB,,, | Performed by: OTOLARYNGOLOGY

## 2023-06-26 PROCEDURE — 99213 OFFICE O/P EST LOW 20 MIN: CPT | Mod: 25,S$GLB,, | Performed by: OTOLARYNGOLOGY

## 2023-06-26 PROCEDURE — 99213 PR OFFICE/OUTPT VISIT, EST, LEVL III, 20-29 MIN: ICD-10-PCS | Mod: 25,S$GLB,, | Performed by: OTOLARYNGOLOGY

## 2023-06-26 PROCEDURE — 1159F MED LIST DOCD IN RCRD: CPT | Mod: CPTII,S$GLB,, | Performed by: OTOLARYNGOLOGY

## 2023-06-26 PROCEDURE — 31575 PR LARYNGOSCOPY, FLEXIBLE; DIAGNOSTIC: ICD-10-PCS | Mod: S$GLB,,, | Performed by: OTOLARYNGOLOGY

## 2023-06-26 PROCEDURE — 1159F PR MEDICATION LIST DOCUMENTED IN MEDICAL RECORD: ICD-10-PCS | Mod: CPTII,S$GLB,, | Performed by: OTOLARYNGOLOGY

## 2023-06-26 PROCEDURE — 1101F PT FALLS ASSESS-DOCD LE1/YR: CPT | Mod: CPTII,S$GLB,, | Performed by: OTOLARYNGOLOGY

## 2023-06-26 NOTE — PROGRESS NOTES
"      SUBJECTIVE:    HPI: Bebe is seeing me today for evaluation of hoarseness that has been present for years. She does smoke but is trying to quit. She denies difficulty swallowing or breathing. In the past, she was having trouble getting "choked," but had an esophageal dilation, which helped.  She does have a history of reflux and takes  Pepcid and Protonix  (maybe-she is unsure about Pepcid). The hoarseness does not affect her quality of life. No aggravating or alleviating factors.     6/26/23 update:  Here for re-evaluation, no new symptoms.  She is still taking her anti-reflux meds.            OBJECTIVE:  Physical Exam  HENT:      Nose: Nose normal.      Mouth/Throat:      Pharynx: Oropharynx is clear. Uvula midline.   Musculoskeletal:      Cervical back: Neck supple.   Lymphadenopathy:      Cervical: No cervical adenopathy.       Due to indication in patient's history, presentation or risk factors, a fiberoptic exam was performed.     SEPARATE PROCEDURE NOTE:    ANESTHESIA:  Topical lidocaine with jamie-synephrine    FINDINGS:  normal tvfs, small polypoid structure noted at esophageal inlet    PROCEDURE:  After verbal consent was obtained, the flexible scope was passed through the patient's nasal cavity without difficulty.  The nasopharynx (adenoid pad) and eustachian tube orifices were first visualized and were found to be normal, without masses or irregularity.  The posterior pharyngeal wall and base of tongue were then examined and no mass or irregular tissue was seen.  The scope was then advanced to the larynx, and the epiglottis, valleculae, and piriform sinuses were normal, without masses or mucosal irregularity.  The false vocal folds and true vocal folds were then examined and were found to have normal mobility (full abduction and adduction) and no masses or mucosal irregularity was seen.  Some redundancy was noted in the interarytenoid area. There was a small, polypoid structure noted at the esophageal " inlet.           ASSESSMENT:    Encounter Diagnoses   Name Primary?    Laryngopharyngeal reflux (LPR) Yes    Polyp of larynx     Hoarseness            PLAN:   Reviewed exam findings with patient. This polypoid area appears benign, but given her smoking history I will let Dr. Cobb take a look to see if it is something that warrants biopsy. Vocal hygiene measures given. Encouraged smoking cessation. Continue reflux meds and follow up as needed.     6/26/23 update:  Her exam today is stable if not improved from last visit.  She does have a small polypoid lesion in her posterior cricoid area.  This does not have any suspicious characteristics and has not changed in size from her previous exam.  She does see GI and had a upper endoscopy recently.  We agreed to continue with observation for now and have her return in 3 months for repeat endoscopy.

## 2023-07-07 ENCOUNTER — PATIENT MESSAGE (OUTPATIENT)
Dept: INFECTIOUS DISEASES | Facility: CLINIC | Age: 68
End: 2023-07-07
Payer: MEDICARE

## 2023-08-18 ENCOUNTER — TELEPHONE (OUTPATIENT)
Dept: DIABETES | Facility: CLINIC | Age: 68
End: 2023-08-18
Payer: MEDICARE

## 2023-08-18 NOTE — TELEPHONE ENCOUNTER
----- Message from Gale Rand sent at 8/18/2023  1:55 PM CDT -----  Type:  Patient Returning Call    Who Called:PAUL TUBBS [[id number]]  Who Left Message for Patient:Francisca   Does the patient know what this is regarding?:YES  Would the patient rather a call back or a response via [facility]? NO  Best Call Back Number:.Telephone Information:  Mobile          [phone]      Additional Information: Pt returning call

## 2023-08-22 ENCOUNTER — OFFICE VISIT (OUTPATIENT)
Dept: DIABETES | Facility: CLINIC | Age: 68
End: 2023-08-22
Payer: MEDICARE

## 2023-08-22 DIAGNOSIS — I73.9 PVD (PERIPHERAL VASCULAR DISEASE): ICD-10-CM

## 2023-08-22 DIAGNOSIS — E78.5 HYPERLIPIDEMIA ASSOCIATED WITH TYPE 2 DIABETES MELLITUS: ICD-10-CM

## 2023-08-22 DIAGNOSIS — I71.40 ABDOMINAL ANEURYSM: ICD-10-CM

## 2023-08-22 DIAGNOSIS — N18.30 CONTROLLED TYPE 2 DIABETES MELLITUS WITH STAGE 3 CHRONIC KIDNEY DISEASE, WITH LONG-TERM CURRENT USE OF INSULIN: Primary | ICD-10-CM

## 2023-08-22 DIAGNOSIS — Z79.4 CONTROLLED TYPE 2 DIABETES MELLITUS WITH STAGE 3 CHRONIC KIDNEY DISEASE, WITH LONG-TERM CURRENT USE OF INSULIN: Primary | ICD-10-CM

## 2023-08-22 DIAGNOSIS — E03.9 HYPOTHYROIDISM, UNSPECIFIED TYPE: ICD-10-CM

## 2023-08-22 DIAGNOSIS — E11.69 HYPERLIPIDEMIA ASSOCIATED WITH TYPE 2 DIABETES MELLITUS: ICD-10-CM

## 2023-08-22 DIAGNOSIS — E55.9 VITAMIN D DEFICIENCY: ICD-10-CM

## 2023-08-22 DIAGNOSIS — E11.22 CONTROLLED TYPE 2 DIABETES MELLITUS WITH STAGE 3 CHRONIC KIDNEY DISEASE, WITH LONG-TERM CURRENT USE OF INSULIN: Primary | ICD-10-CM

## 2023-08-22 DIAGNOSIS — I10 ESSENTIAL HYPERTENSION: ICD-10-CM

## 2023-08-22 PROCEDURE — 99441 PR PHYSICIAN TELEPHONE EVALUATION 5-10 MIN: ICD-10-PCS | Mod: 95,,, | Performed by: NURSE PRACTITIONER

## 2023-08-22 PROCEDURE — 99441 PR PHYSICIAN TELEPHONE EVALUATION 5-10 MIN: CPT | Mod: 95,,, | Performed by: NURSE PRACTITIONER

## 2023-08-22 PROCEDURE — 1159F MED LIST DOCD IN RCRD: CPT | Mod: CPTII,95,, | Performed by: NURSE PRACTITIONER

## 2023-08-22 PROCEDURE — 95251 PR GLUCOSE MONITOR, 72 HOUR, PHYS INTERP: ICD-10-PCS | Mod: NDTC,S$GLB,, | Performed by: NURSE PRACTITIONER

## 2023-08-22 PROCEDURE — 4010F ACE/ARB THERAPY RXD/TAKEN: CPT | Mod: CPTII,95,, | Performed by: NURSE PRACTITIONER

## 2023-08-22 PROCEDURE — 1159F PR MEDICATION LIST DOCUMENTED IN MEDICAL RECORD: ICD-10-PCS | Mod: CPTII,95,, | Performed by: NURSE PRACTITIONER

## 2023-08-22 PROCEDURE — 95251 CONT GLUC MNTR ANALYSIS I&R: CPT | Mod: NDTC,S$GLB,, | Performed by: NURSE PRACTITIONER

## 2023-08-22 PROCEDURE — 1160F PR REVIEW ALL MEDS BY PRESCRIBER/CLIN PHARMACIST DOCUMENTED: ICD-10-PCS | Mod: CPTII,95,, | Performed by: NURSE PRACTITIONER

## 2023-08-22 PROCEDURE — 1160F RVW MEDS BY RX/DR IN RCRD: CPT | Mod: CPTII,95,, | Performed by: NURSE PRACTITIONER

## 2023-08-22 PROCEDURE — 4010F PR ACE/ARB THEARPY RXD/TAKEN: ICD-10-PCS | Mod: CPTII,95,, | Performed by: NURSE PRACTITIONER

## 2023-08-22 RX ORDER — BLOOD-GLUCOSE SENSOR
EACH MISCELLANEOUS
Qty: 6 EACH | Refills: 3 | Status: SHIPPED | OUTPATIENT
Start: 2023-08-22 | End: 2024-02-22 | Stop reason: SDUPTHER

## 2023-08-22 NOTE — Clinical Note
Pls call patient to schedule her follow up -- needs a 6 month follow up visit with me. Notes: Sigrid

## 2023-08-22 NOTE — PROGRESS NOTES
Established Patient - Audio Only Telehealth Visit     The patient location is: [place]  The chief complaint leading to consultation is: diabetes management follow up   Visit type: Virtual visit with audio only (telephone)  Total time spent with patient: 8 minutes       The reason for the audio only service rather than synchronous audio and video virtual visit was related to technical difficulties or patient preference/necessity.     Each patient to whom I provide medical services by telemedicine is:  (1) informed of the relationship between the physician and patient and the respective role of any other health care provider with respect to management of the patient; and (2) notified that they may decline to receive medical services by telemedicine and may withdraw from such care at any time. Patient verbally consented to receive this service via voice-only telephone call.       HPI: She reports doing well. Per Sigrid download, for the last 14 days: Avg glucose of 114 mg/dL and 96% in range. 4% mild hyperglycemia with no readings > 250. No hypoglycemia. Estimated GMI 6% with a glucose variability of 24.2%. No specific patterns noted. Glycemic control is stable with nothing consistent enough to warrant adjustment. Based on review, plan to continue medications as current and refer for Sigrid 3. Tolerating meds well without side effects.     Sigrid supplies- Through [employer]        Diabetes Medications               insulin degludec (TRESIBA FLEXTOUCH U-200) 200 unit/mL (3 mL) insulin pen INJECT 14 UNITS SUBCUTANEOUSLY DAILY (BULK)    JARDIANCE 25 mg tablet Take 1 tablet (25 mg total) by mouth once daily.    metFORMIN (GLUCOPHAGE) 1000 MG tablet Take 1 tablet (1,000 mg total) by mouth 2 (two) times daily.           Diabetes Management Status    Statin: Taking  ACE/ARB: Taking    Screening or Prevention Patient's value Goal Complete/Controlled?   HgA1C Testing and Control   Lab Results   Component Value Date    HGBA1C 5.3  "06/02/2020      Annually/Less than 8% No   Lipid profile Most Recent Lipid Panel Health Maintenance Topic Completion: Not Found Annually No   LDL control Lab Results   Component Value Date    LDLCALC 81 06/02/2020    Annually/Less than 100 mg/dl  No   Nephropathy screening No results found for: "LABMICR"  Lab Results   Component Value Date    PROTEINUA Negative 09/23/2021     Lab Results   Component Value Date    UTPCR Unable to calculate 10/12/2020      Annually No   Blood pressure BP Readings from Last 1 Encounters:   06/08/23 (!) 110/50    Less than 140/90 Yes   Dilated retinal exam : 04/19/2023 Annually Yes   Foot exam   : 08/16/2022 Annually No Deferred-audio visit         Assessment and plan:      Bebe was seen today for diabetes mellitus.    Diagnoses and all orders for this visit:    Controlled type 2 diabetes mellitus with stage 3 chronic kidney disease, with long-term current use of insulin  -     FREESTYLE LAINEY 3 SENSOR Torrie; Change sensor every 14 days    Chronic,stable -     Continue Tresiba 14 units once a day.     Continue Metformin     Continue Jardiance to 25 mg daily.    Continuous glucose monitoring report:    The patient's CGM was downloaded and was reviewed for the last 14 days. See HPI for interpretation & plan.     Follow up 6 months.    Hyperlipidemia associated with type 2 diabetes mellitus    Essential hypertension    Hypothyroidism, unspecified type    PVD (peripheral vascular disease)    Abdominal aneurysm    Vitamin D deficiency                              This service was not originating from a related E/M service provided within the previous 7 days nor will  to an E/M service or procedure within the next 24 hours or my soonest available appointment.  Prevailing standard of care was able to be met in this audio-only visit.          "

## 2023-08-22 NOTE — PATIENT INSTRUCTIONS
Upgrade to Sigrid 3.     1. Limit carbs to no more than 45 grams with each meal. Never eat carbs by themselves, always add protein. Make snacks low carb or non-carb only; Stick to snack sheet provided    2. Continue checking blood sugar 3 times daily: Always check before meals and occasionally 2 hours after any meal or bedtime. Blood sugar goals should be a fasting blood sugar between , and no blood sugars throughout the day over 180 is ok, less than 160 better, less than 140 perfect. Keep a log book and bring with you to all appointments along with your glucose meter.    3. Medications adjusted for today's visit include:    Continue Tresiba 14 units once a day.     Continue Metformin     Continue Jardiance to 25 mg daily.    4. Carry glucose tablets with you at all times to treat a low blood sugar episode (less than 70). Chew 4 tablets and re-check blood sugar in 15 minutes. If still low, repeat this. Always call the clinic to give an update for any low blood sugar episodes.    5. Continue efforts to increase exercise as tolerated.    6. Follow-up for your next visit in 6 months or sooner if needed.    7. Please either drop off, fax, or MyChart your readings to me as needed    Drop off or fax: Brayden Chou

## 2023-09-27 ENCOUNTER — OFFICE VISIT (OUTPATIENT)
Dept: OTOLARYNGOLOGY | Facility: CLINIC | Age: 68
End: 2023-09-27
Payer: MEDICARE

## 2023-09-27 DIAGNOSIS — R49.0 HOARSENESS: ICD-10-CM

## 2023-09-27 DIAGNOSIS — J38.1 POLYP OF LARYNX: ICD-10-CM

## 2023-09-27 DIAGNOSIS — K21.9 LARYNGOPHARYNGEAL REFLUX (LPR): ICD-10-CM

## 2023-09-27 DIAGNOSIS — D49.1 LARYNGEAL NEOPLASM: Primary | ICD-10-CM

## 2023-09-27 PROCEDURE — 1159F MED LIST DOCD IN RCRD: CPT | Mod: CPTII,S$GLB,, | Performed by: OTOLARYNGOLOGY

## 2023-09-27 PROCEDURE — 99214 OFFICE O/P EST MOD 30 MIN: CPT | Mod: 25,S$GLB,, | Performed by: OTOLARYNGOLOGY

## 2023-09-27 PROCEDURE — 99999 PR PBB SHADOW E&M-EST. PATIENT-LVL III: CPT | Mod: PBBFAC,,, | Performed by: OTOLARYNGOLOGY

## 2023-09-27 PROCEDURE — 4010F PR ACE/ARB THEARPY RXD/TAKEN: ICD-10-PCS | Mod: CPTII,S$GLB,, | Performed by: OTOLARYNGOLOGY

## 2023-09-27 PROCEDURE — 3288F PR FALLS RISK ASSESSMENT DOCUMENTED: ICD-10-PCS | Mod: CPTII,S$GLB,, | Performed by: OTOLARYNGOLOGY

## 2023-09-27 PROCEDURE — 99214 PR OFFICE/OUTPT VISIT, EST, LEVL IV, 30-39 MIN: ICD-10-PCS | Mod: 25,S$GLB,, | Performed by: OTOLARYNGOLOGY

## 2023-09-27 PROCEDURE — 31575 PR LARYNGOSCOPY, FLEXIBLE; DIAGNOSTIC: ICD-10-PCS | Mod: S$GLB,,, | Performed by: OTOLARYNGOLOGY

## 2023-09-27 PROCEDURE — 1101F PT FALLS ASSESS-DOCD LE1/YR: CPT | Mod: CPTII,S$GLB,, | Performed by: OTOLARYNGOLOGY

## 2023-09-27 PROCEDURE — 1159F PR MEDICATION LIST DOCUMENTED IN MEDICAL RECORD: ICD-10-PCS | Mod: CPTII,S$GLB,, | Performed by: OTOLARYNGOLOGY

## 2023-09-27 PROCEDURE — 1101F PR PT FALLS ASSESS DOC 0-1 FALLS W/OUT INJ PAST YR: ICD-10-PCS | Mod: CPTII,S$GLB,, | Performed by: OTOLARYNGOLOGY

## 2023-09-27 PROCEDURE — 4010F ACE/ARB THERAPY RXD/TAKEN: CPT | Mod: CPTII,S$GLB,, | Performed by: OTOLARYNGOLOGY

## 2023-09-27 PROCEDURE — 3288F FALL RISK ASSESSMENT DOCD: CPT | Mod: CPTII,S$GLB,, | Performed by: OTOLARYNGOLOGY

## 2023-09-27 PROCEDURE — 99999 PR PBB SHADOW E&M-EST. PATIENT-LVL III: ICD-10-PCS | Mod: PBBFAC,,, | Performed by: OTOLARYNGOLOGY

## 2023-09-27 PROCEDURE — 31575 DIAGNOSTIC LARYNGOSCOPY: CPT | Mod: S$GLB,,, | Performed by: OTOLARYNGOLOGY

## 2023-09-27 NOTE — PROGRESS NOTES
"      SUBJECTIVE:    HPI: Bebe is seeing me today for evaluation of hoarseness that has been present for years. She does smoke but is trying to quit. She denies difficulty swallowing or breathing. In the past, she was having trouble getting "choked," but had an esophageal dilation, which helped.  She does have a history of reflux and takes  Pepcid and Protonix  (maybe-she is unsure about Pepcid). The hoarseness does not affect her quality of life. No aggravating or alleviating factors.     6/26/23 update:  Here for re-evaluation, no new symptoms.  She is still taking her anti-reflux meds.      9/27/23 update:  No new symptoms, she feels that her reflux symptoms are well controlled on her current regimen.            OBJECTIVE:  Physical Exam  HENT:      Nose: Nose normal.      Mouth/Throat:      Pharynx: Oropharynx is clear. Uvula midline.   Musculoskeletal:      Cervical back: Neck supple.   Lymphadenopathy:      Cervical: No cervical adenopathy.         Due to indication in patient's history, presentation or risk factors, a fiberoptic exam was performed.     SEPARATE PROCEDURE NOTE:    ANESTHESIA:  Topical lidocaine with jamie-synephrine    FINDINGS:  normal tvfs, small polypoid structure noted at esophageal inlet, papillomatous lesion left AE fold    PROCEDURE:  After verbal consent was obtained, the flexible scope was passed through the patient's nasal cavity without difficulty.  The nasopharynx (adenoid pad) and eustachian tube orifices were first visualized and were found to be normal, without masses or irregularity.  The posterior pharyngeal wall and base of tongue were then examined and no mass or irregular tissue was seen.  The scope was then advanced to the larynx, and the epiglottis, valleculae, and piriform sinuses were normal aside from a small flat papillomatous lesion on her left AE fold.  The false vocal folds and true vocal folds were then examined and were found to have normal mobility (full abduction " and adduction) and no masses or mucosal irregularity was seen.  Some redundancy was noted in the interarytenoid area. There was a small, polypoid structure noted at the esophageal inlet.           ASSESSMENT:    Encounter Diagnoses   Name Primary?    Laryngeal neoplasm Yes    Polyp of larynx     Hoarseness     Laryngopharyngeal reflux (LPR)              PLAN:   Reviewed exam findings with patient. This polypoid area appears benign, but given her smoking history I will let Dr. Cobb take a look to see if it is something that warrants biopsy. Vocal hygiene measures given. Encouraged smoking cessation. Continue reflux meds and follow up as needed.     6/26/23 update:  Her exam today is stable if not improved from last visit.  She does have a small polypoid lesion in her posterior cricoid area.  This does not have any suspicious characteristics and has not changed in size from her previous exam.  She does see GI and had a upper endoscopy recently.  We agreed to continue with observation for now and have her return in 3 months for repeat endoscopy.    9/27/23 update:  The polypoid lesion in her postcricoid area appears unchanged and does not have any obvious suspicious characteristics.  She does have a papillomatous lesion on her left AE fold that is more prominent today on exam.  I recommended a laryngology evaluation and she would like to stay in [place], so we will schedule with Dr. Maninder Noel at [facility].

## 2023-10-01 DIAGNOSIS — E11.22 CONTROLLED TYPE 2 DIABETES MELLITUS WITH STAGE 3 CHRONIC KIDNEY DISEASE, WITH LONG-TERM CURRENT USE OF INSULIN: ICD-10-CM

## 2023-10-01 DIAGNOSIS — Z79.4 CONTROLLED TYPE 2 DIABETES MELLITUS WITH STAGE 3 CHRONIC KIDNEY DISEASE, WITH LONG-TERM CURRENT USE OF INSULIN: ICD-10-CM

## 2023-10-01 DIAGNOSIS — N18.30 CONTROLLED TYPE 2 DIABETES MELLITUS WITH STAGE 3 CHRONIC KIDNEY DISEASE, WITH LONG-TERM CURRENT USE OF INSULIN: ICD-10-CM

## 2023-10-03 RX ORDER — INSULIN DEGLUDEC 200 U/ML
INJECTION, SOLUTION SUBCUTANEOUS
Qty: 3 PEN | Refills: 0 | Status: SHIPPED | OUTPATIENT
Start: 2023-10-03 | End: 2024-02-22 | Stop reason: SDUPTHER

## 2023-10-03 NOTE — TELEPHONE ENCOUNTER
Courtesy refill of Tresiba given for coverage of BRAD Moses. Please make sure pt has follow up with Viry.     Elvin Yo PA-C  Diabetes Management

## 2023-11-09 ENCOUNTER — HOSPITAL ENCOUNTER (OUTPATIENT)
Dept: RADIOLOGY | Facility: HOSPITAL | Age: 68
Discharge: HOME | End: 2023-11-09
Attending: INTERNAL MEDICINE
Payer: MEDICARE

## 2023-11-09 DIAGNOSIS — F17.210 SMOKING GREATER THAN 30 PACK YEARS: ICD-10-CM

## 2023-11-09 PROCEDURE — 71271 CT THORAX LUNG CANCER SCR C-: CPT | Mod: 26,,, | Performed by: STUDENT IN AN ORGANIZED HEALTH CARE EDUCATION/TRAINING PROGRAM

## 2023-11-09 PROCEDURE — 71271 CT CHEST LUNG SCREENING LOW DOSE: ICD-10-PCS | Mod: 26,,, | Performed by: STUDENT IN AN ORGANIZED HEALTH CARE EDUCATION/TRAINING PROGRAM

## 2023-11-09 PROCEDURE — 71271 CT THORAX LUNG CANCER SCR C-: CPT | Mod: TC

## 2023-11-20 DIAGNOSIS — E11.22 CONTROLLED TYPE 2 DIABETES MELLITUS WITH STAGE 3 CHRONIC KIDNEY DISEASE, WITH LONG-TERM CURRENT USE OF INSULIN: ICD-10-CM

## 2023-11-20 DIAGNOSIS — Z79.4 CONTROLLED TYPE 2 DIABETES MELLITUS WITH STAGE 3 CHRONIC KIDNEY DISEASE, WITH LONG-TERM CURRENT USE OF INSULIN: ICD-10-CM

## 2023-11-20 DIAGNOSIS — N18.30 CONTROLLED TYPE 2 DIABETES MELLITUS WITH STAGE 3 CHRONIC KIDNEY DISEASE, WITH LONG-TERM CURRENT USE OF INSULIN: ICD-10-CM

## 2023-11-21 RX ORDER — EMPAGLIFLOZIN 25 MG/1
25 TABLET, FILM COATED ORAL DAILY
Qty: 90 TABLET | Refills: 3 | Status: SHIPPED | OUTPATIENT
Start: 2023-11-21 | End: 2023-11-22 | Stop reason: SDUPTHER

## 2023-11-22 DIAGNOSIS — Z79.4 CONTROLLED TYPE 2 DIABETES MELLITUS WITH STAGE 3 CHRONIC KIDNEY DISEASE, WITH LONG-TERM CURRENT USE OF INSULIN: ICD-10-CM

## 2023-11-22 DIAGNOSIS — N18.30 CONTROLLED TYPE 2 DIABETES MELLITUS WITH STAGE 3 CHRONIC KIDNEY DISEASE, WITH LONG-TERM CURRENT USE OF INSULIN: ICD-10-CM

## 2023-11-22 DIAGNOSIS — E11.22 CONTROLLED TYPE 2 DIABETES MELLITUS WITH STAGE 3 CHRONIC KIDNEY DISEASE, WITH LONG-TERM CURRENT USE OF INSULIN: ICD-10-CM

## 2023-11-22 RX ORDER — EMPAGLIFLOZIN 25 MG/1
25 TABLET, FILM COATED ORAL DAILY
Qty: 90 TABLET | Refills: 3 | Status: SHIPPED | OUTPATIENT
Start: 2023-11-22 | End: 2024-02-22 | Stop reason: SDUPTHER

## 2023-12-07 ENCOUNTER — OFFICE VISIT (OUTPATIENT)
Dept: PULMONOLOGY | Facility: CLINIC | Age: 68
End: 2023-12-07
Payer: MEDICARE

## 2023-12-07 VITALS
SYSTOLIC BLOOD PRESSURE: 122 MMHG | BODY MASS INDEX: 33.03 KG/M2 | HEIGHT: 61 IN | WEIGHT: 174.94 LBS | HEART RATE: 91 BPM | DIASTOLIC BLOOD PRESSURE: 68 MMHG | RESPIRATION RATE: 17 BRPM | OXYGEN SATURATION: 92 %

## 2023-12-07 DIAGNOSIS — I10 PRIMARY HYPERTENSION: ICD-10-CM

## 2023-12-07 DIAGNOSIS — E66.01 SEVERE OBESITY (BMI 35.0-35.9 WITH COMORBIDITY): ICD-10-CM

## 2023-12-07 DIAGNOSIS — E66.9 OBESITY (BMI 35.0-39.9 WITHOUT COMORBIDITY): ICD-10-CM

## 2023-12-07 DIAGNOSIS — Z72.0 TOBACCO USE: ICD-10-CM

## 2023-12-07 DIAGNOSIS — J44.9 CHRONIC OBSTRUCTIVE PULMONARY DISEASE, UNSPECIFIED COPD TYPE: Primary | ICD-10-CM

## 2023-12-07 PROCEDURE — 4010F PR ACE/ARB THEARPY RXD/TAKEN: ICD-10-PCS | Mod: CPTII,S$GLB,, | Performed by: PHYSICIAN ASSISTANT

## 2023-12-07 PROCEDURE — 3008F BODY MASS INDEX DOCD: CPT | Mod: CPTII,S$GLB,, | Performed by: PHYSICIAN ASSISTANT

## 2023-12-07 PROCEDURE — 3078F PR MOST RECENT DIASTOLIC BLOOD PRESSURE < 80 MM HG: ICD-10-PCS | Mod: CPTII,S$GLB,, | Performed by: PHYSICIAN ASSISTANT

## 2023-12-07 PROCEDURE — 1101F PR PT FALLS ASSESS DOC 0-1 FALLS W/OUT INJ PAST YR: ICD-10-PCS | Mod: CPTII,S$GLB,, | Performed by: PHYSICIAN ASSISTANT

## 2023-12-07 PROCEDURE — 1101F PT FALLS ASSESS-DOCD LE1/YR: CPT | Mod: CPTII,S$GLB,, | Performed by: PHYSICIAN ASSISTANT

## 2023-12-07 PROCEDURE — 3288F FALL RISK ASSESSMENT DOCD: CPT | Mod: CPTII,S$GLB,, | Performed by: PHYSICIAN ASSISTANT

## 2023-12-07 PROCEDURE — 3074F PR MOST RECENT SYSTOLIC BLOOD PRESSURE < 130 MM HG: ICD-10-PCS | Mod: CPTII,S$GLB,, | Performed by: PHYSICIAN ASSISTANT

## 2023-12-07 PROCEDURE — 1159F PR MEDICATION LIST DOCUMENTED IN MEDICAL RECORD: ICD-10-PCS | Mod: CPTII,S$GLB,, | Performed by: PHYSICIAN ASSISTANT

## 2023-12-07 PROCEDURE — 3074F SYST BP LT 130 MM HG: CPT | Mod: CPTII,S$GLB,, | Performed by: PHYSICIAN ASSISTANT

## 2023-12-07 PROCEDURE — 99999 PR PBB SHADOW E&M-EST. PATIENT-LVL V: ICD-10-PCS | Mod: PBBFAC,,, | Performed by: PHYSICIAN ASSISTANT

## 2023-12-07 PROCEDURE — 3078F DIAST BP <80 MM HG: CPT | Mod: CPTII,S$GLB,, | Performed by: PHYSICIAN ASSISTANT

## 2023-12-07 PROCEDURE — 4010F ACE/ARB THERAPY RXD/TAKEN: CPT | Mod: CPTII,S$GLB,, | Performed by: PHYSICIAN ASSISTANT

## 2023-12-07 PROCEDURE — 1160F PR REVIEW ALL MEDS BY PRESCRIBER/CLIN PHARMACIST DOCUMENTED: ICD-10-PCS | Mod: CPTII,S$GLB,, | Performed by: PHYSICIAN ASSISTANT

## 2023-12-07 PROCEDURE — 99999 PR PBB SHADOW E&M-EST. PATIENT-LVL V: CPT | Mod: PBBFAC,,, | Performed by: PHYSICIAN ASSISTANT

## 2023-12-07 PROCEDURE — 3288F PR FALLS RISK ASSESSMENT DOCUMENTED: ICD-10-PCS | Mod: CPTII,S$GLB,, | Performed by: PHYSICIAN ASSISTANT

## 2023-12-07 PROCEDURE — 3008F PR BODY MASS INDEX (BMI) DOCUMENTED: ICD-10-PCS | Mod: CPTII,S$GLB,, | Performed by: PHYSICIAN ASSISTANT

## 2023-12-07 PROCEDURE — 99214 OFFICE O/P EST MOD 30 MIN: CPT | Mod: S$GLB,,, | Performed by: PHYSICIAN ASSISTANT

## 2023-12-07 PROCEDURE — 99214 PR OFFICE/OUTPT VISIT, EST, LEVL IV, 30-39 MIN: ICD-10-PCS | Mod: S$GLB,,, | Performed by: PHYSICIAN ASSISTANT

## 2023-12-07 PROCEDURE — 1159F MED LIST DOCD IN RCRD: CPT | Mod: CPTII,S$GLB,, | Performed by: PHYSICIAN ASSISTANT

## 2023-12-07 PROCEDURE — 1160F RVW MEDS BY RX/DR IN RCRD: CPT | Mod: CPTII,S$GLB,, | Performed by: PHYSICIAN ASSISTANT

## 2023-12-07 NOTE — PROGRESS NOTES
Pulmonary Outpatient Follow Up Visit     Subjective:       Patient ID: Bebe Sandhu is a 68 y.o. female.    Chief Complaint: COPD    12/7/23  Here for 6 month follow up for COPD  06/08/2023 smoking few cigarettes.  On nicotine patches.  Follow-up with smoking cessation.  Continuing to make effort to quit smoking.    On albuterol p.r.n. and Anoro 1 puff daily.    Doing well without signs of exacerbation  Sleeps with 2L oxygen  She is scheduled next month for sleep study outside [facility] with ENT    COPD Questionnaire  How often do you cough?: Some of the time  How often do you have phlegm (mucus) in your chest?: Some of the time  How often does your chest feel tight?: Never  When you walk up a hill or one flight of stairs, how often are you breathless?: All of the time  How often are you limited doing any activities at home?: Most of the time  How often are you confident leaving the house despite your lung condition?: All of the time  How often do you sleep soundly?: All of the time  How often do you have energy?: Almost never  Total score: 19    6/8/23  Patient is a 67 y.o. female presenting for 3 months follow-up.      06/08/2023 smoking few cigarettes.  On nicotine patches.  Follow-up with smoking cessation.  Continuing to make effort to quit smoking.      On albuterol p.r.n. and Anoro 1 puff daily.      Moderately severe COPD noted on spirometry.      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      Initially evaluated March 2023 and she is known with  more than 30 pack year     On albuterol p.r.n. Anoro 1 puff daily.  Recent pneumonia Kylah 2022.  On O2 during sleep and on exertion.  Making effort to quit smoking with nicotine replacement and Wellbutrin.    Previously was seeing pulmonology at [facility] last seen Dr. Andrea Jaquez.      Known with moderately severe COPD as per PFT in 2018.  Low-dose CT  scan done [facility] reviewed.  .        Review of Systems   Constitutional:  Positive for fatigue. Negative for fever and chills.   HENT:  Positive for voice change. Negative for nosebleeds.    Eyes:  Negative for redness.   Respiratory:  Positive for cough and dyspnea on extertion. Negative for choking, shortness of breath and use of rescue inhaler.    Cardiovascular:  Negative for chest pain.   Genitourinary:  Negative for hematuria.   Endocrine:  Negative for cold intolerance.    Musculoskeletal:  Positive for arthralgias.   Gastrointestinal:  Negative for vomiting.   Neurological:  Negative for syncope.   Hematological:  Negative for adenopathy.   Psychiatric/Behavioral:  Negative for confusion.        Outpatient Encounter Medications as of 2023   Medication Sig Dispense Refill    albuterol (PROVENTIL/VENTOLIN HFA) 90 mcg/actuation inhaler SMARTSI Puff(s) Via Inhaler Every 6 Hours PRN      albuterol-ipratropium (DUO-NEB) 2.5 mg-0.5 mg/3 mL nebulizer solution Take by nebulization.      alendronate (FOSAMAX) 70 MG tablet Take 70 mg by mouth every 7 days.      alprazolam (XANAX) 1 MG tablet Take 1 mg by mouth 2 (two) times daily.      amLODIPine (NORVASC) 5 MG tablet Take 2.5 mg by mouth.      ANORO ELLIPTA 62.5-25 mcg/actuation DsDv Inhale 1 puff into the lungs once daily.      aspirin 81 MG Chew Take 81 mg by mouth once daily.      cholecalciferol, vitamin D3, 125 mcg (5,000 unit) Tab Take 1 tablet by mouth.      citalopram (CELEXA) 20 MG tablet Take 20 mg by mouth every morning.  1    cloNIDine (CATAPRES) 0.2 MG tablet Take 0.2 mg by mouth once.      ezetimibe (ZETIA) 10 mg tablet Take 10 mg by mouth.      famotidine (PEPCID) 20 MG tablet Take 20 mg by mouth nightly as needed for Heartburn.      ferrous sulfate (FEOSOL) 325 mg (65 mg iron) Tab tablet 1 tablet      flash glucose sensor (FREESTYLE LAINEY 2 SENSOR) Kit Change sensor every 14 days 6 kit 3    folic acid (FOLVITE) 1 MG tablet Take 1  "mg by mouth.      FREESTYLE LAINEY 3 SENSOR Torrie Change sensor every 14 days 6 each 3    furosemide (LASIX) 20 MG tablet Take 20 mg by mouth once daily.      hydrochlorothiazide (HYDRODIURIL) 25 MG tablet Take 25 mg by mouth once daily.      icosapent ethyl 1 gram Cap Take 2 g by mouth.      insulin degludec (TRESIBA FLEXTOUCH U-200) 200 unit/mL (3 mL) insulin pen INJECT 14 UNITS SUBCUTANEOUSLY DAILY (BULK) 3 pen 0    JARDIANCE 25 mg tablet Take 1 tablet (25 mg total) by mouth once daily. 90 tablet 3    levalbuterol (XOPENEX HFA) 45 mcg/actuation inhaler Inhale 2 puffs into the lungs every 4 (four) hours as needed.      levothyroxine (SYNTHROID) 50 MCG tablet Take 50 mcg by mouth.      loratadine (CLARITIN) 10 mg tablet Take 10 mg by mouth once daily.      meloxicam (MOBIC) 7.5 MG tablet Take 7.5 mg by mouth once daily.      metFORMIN (GLUCOPHAGE) 1000 MG tablet Take 1 tablet (1,000 mg total) by mouth 2 (two) times daily. 180 tablet 3    mirtazapine (REMERON) 7.5 MG Tab Take 7.5 mg by mouth.      nicotine (NICODERM CQ) 14 mg/24 hr Place 1 patch onto the skin once daily. 14 patch 0    nicotine polacrilex 4 MG Lozg Take 4 mg by mouth as needed.      pantoprazole (PROTONIX) 20 MG tablet Take 20 mg by mouth every morning.      pen needle, diabetic (BD ULTRA-FINE IAN PEN NEEDLE) 32 gauge x 5/32" Ndle USE with tresiba DAILY (BULK) 100 each 3    pneumoc 20-reena conj-dip cr,PF, (PREVNAR 20, PF,) 0.5 mL Syrg injection Inject 0.5 ml into the muscle. 0.5 mL 0    potassium chloride SA (K-DUR,KLOR-CON) 20 MEQ tablet Take 20 mEq by mouth once daily.      quinapril (ACCUPRIL) 20 MG tablet Take 20 mg by mouth every evening.      rosuvastatin (CRESTOR) 10 MG tablet Take 10 mg by mouth once daily.      rosuvastatin (CRESTOR) 20 MG tablet Take 20 mg by mouth.      thiamine 100 MG tablet Take by mouth.      venlafaxine (EFFEXOR-XR) 75 MG 24 hr capsule Take 75 mg by mouth once daily.      vitamin E 600 UNIT capsule Take 1 capsule by " "mouth once daily.      [DISCONTINUED] JARDIANCE 25 mg tablet Take 1 tablet (25 mg total) by mouth once daily. 90 tablet 3    [DISCONTINUED] JARDIANCE 25 mg tablet Take 1 tablet (25 mg total) by mouth once daily. 90 tablet 3     No facility-administered encounter medications on file as of 12/7/2023.       Objective:     Vital Signs (Most Recent)  Vital Signs  Pulse: 91  Resp: 17  SpO2: (!) 92 %  BP: 122/68  Height and Weight  Height: 5' 1" (154.9 cm)  Weight: 79.3 kg (174 lb 15 oz)  BSA (Calculated - sq m): 1.85 sq meters  BMI (Calculated): 33.1  Weight in (lb) to have BMI = 25: 132]  Wt Readings from Last 2 Encounters:   12/07/23 79.3 kg (174 lb 15 oz)   06/26/23 82.3 kg (181 lb 7 oz)       Physical Exam   Constitutional: She is oriented to person, place, and time. She appears well-developed. No distress.   HENT:   Head: Normocephalic.   Cardiovascular: Normal rate.   Pulmonary/Chest: Normal expansion and effort normal. No stridor. No respiratory distress. She has decreased breath sounds. She exhibits no tenderness.   Abdominal: She exhibits no distension.   Musculoskeletal:         General: No tenderness.      Cervical back: Neck supple.   Lymphadenopathy:     She has no cervical adenopathy.   Neurological: She is alert and oriented to person, place, and time. Gait normal.   Skin: Skin is warm.   Psychiatric: She has a normal mood and affect. Her behavior is normal. Judgment and thought content normal.   Nursing note and vitals reviewed.      Laboratory  Lab Results   Component Value Date    WBC 10.59 09/23/2021    RBC 4.90 09/23/2021    HGB 14.5 09/23/2021    HCT 45.5 09/23/2021    MCV 93 09/23/2021    MCH 29.6 09/23/2021    MCHC 31.9 (L) 09/23/2021    RDW 13.1 09/23/2021     09/23/2021    MPV 9.6 09/23/2021    GRAN 7.6 09/23/2021    GRAN 72.1 09/23/2021    LYMPH 2.3 09/23/2021    LYMPH 21.4 09/23/2021    MONO 0.4 09/23/2021    MONO 3.9 (L) 09/23/2021    EOS 0.1 09/23/2021    BASO 0.10 09/23/2021    " "EOSINOPHIL 1.2 09/23/2021    BASOPHIL 0.9 09/23/2021       BMP  Lab Results   Component Value Date     09/23/2021    K 4.1 09/23/2021     09/23/2021    CO2 28 09/23/2021    BUN 18 10/25/2021    CREATININE 1.00 10/25/2021    CALCIUM 9.5 09/23/2021    ANIONGAP 14 09/23/2021    ESTGFRAFRICA 67 10/25/2021    EGFRNONAA 59.3 (A) 09/23/2021       No results found for: "BNP"    Lab Results   Component Value Date    TSH 1.182 06/02/2020       CT Chest Lung Screening Low Dose  Narrative: EXAMINATION:  CT CHEST LUNG SCREENING LOW DOSE    CLINICAL HISTORY:  30 py SMOKER; Nicotine dependence, cigarettes, uncomplicated    TECHNIQUE:  CT of the thorax was performed with low dose, lung screening protocol.  No contrast was administered.  Sagittal and coronal reconstructions were obtained.  All CT scans at this facility are performed using dose optimization techniques including the following: automated exposure control; adjustment of the mA and/or kV; use of iterative reconstruction technique.    COMPARISON:  Chest radiograph 06/07/2023    FINDINGS:  Lungs: There are no abnormal opacities that require further evaluation.  There is a subpleural solid nodule within the left anterior lobe, measuring 0.5 cm on series 4, image 165.  The lungs show no findings consistent with emphysema.    Pleura:   No effusion..    Heart and pericardium: Normal size without effusion.    Aorta and vasculature: Atherosclerosis including coronary arteries.    Chest wall and skeletal structures: Unremarkable except age-appropriate degenerative changes.    Upper abdomen: Unremarkable.  Impression: Lung-RADS Category:  2 - Benign Appearance or Behavior - continue annual screening with LDCT in 12 months.    Clinically or potentially clinically significant non lung cancer finding:  None.    Prior Lung Cancer Modifier:  No history of prior lung cancer.    Electronically signed by: Jaida" Tiffany  Date:    11/09/2023  Time:    12:06          Assessment/Plan:   Chronic obstructive pulmonary disease, unspecified COPD type  -     Stress test, pulmonary; Future; Expected date: 12/07/2023  -     Spirometry without Bronchodilator; Future; Expected date: 12/07/2023    Tobacco use    Primary hypertension    Severe obesity (BMI 35.0-35.9 with comorbidity)    Obesity (BMI 35.0-39.9 without comorbidity)      Weight loss and exercise to improve overall health.  Yearly low-dose CT scan of the chest November 2024.    Continue albuterol p.r.n. continue Anoro 1 puff daily.    Encouraged patient to continue smoking cessation effort.  Continue nicotine patches.    Continue ENT follow up  She will send us copy of her sleep study report when complete      Follow up in about 6 months (around 6/7/2024) for shelly lopez.    This note was prepared using voice recognition system and is likely to have sound alike errors that may have been overlooked even after proof reading.  Please call me with any questions    Discussed diagnosis, its evaluation, treatment and usual course. All questions answered.      Taty Barker PA-C

## 2024-02-22 ENCOUNTER — OFFICE VISIT (OUTPATIENT)
Dept: DIABETES | Facility: CLINIC | Age: 69
End: 2024-02-22
Payer: MEDICARE

## 2024-02-22 ENCOUNTER — TELEPHONE (OUTPATIENT)
Dept: DIABETES | Facility: CLINIC | Age: 69
End: 2024-02-22
Payer: MEDICARE

## 2024-02-22 VITALS
WEIGHT: 178.81 LBS | BODY MASS INDEX: 33.76 KG/M2 | HEIGHT: 61 IN | SYSTOLIC BLOOD PRESSURE: 95 MMHG | HEART RATE: 94 BPM | DIASTOLIC BLOOD PRESSURE: 64 MMHG

## 2024-02-22 DIAGNOSIS — Z79.4 CONTROLLED TYPE 2 DIABETES MELLITUS WITH STAGE 3 CHRONIC KIDNEY DISEASE, WITH LONG-TERM CURRENT USE OF INSULIN: Primary | ICD-10-CM

## 2024-02-22 DIAGNOSIS — I71.40 ABDOMINAL ANEURYSM: ICD-10-CM

## 2024-02-22 DIAGNOSIS — E55.9 VITAMIN D DEFICIENCY: ICD-10-CM

## 2024-02-22 DIAGNOSIS — E78.5 HYPERLIPIDEMIA ASSOCIATED WITH TYPE 2 DIABETES MELLITUS: ICD-10-CM

## 2024-02-22 DIAGNOSIS — I10 ESSENTIAL HYPERTENSION: ICD-10-CM

## 2024-02-22 DIAGNOSIS — I73.9 PVD (PERIPHERAL VASCULAR DISEASE): ICD-10-CM

## 2024-02-22 DIAGNOSIS — E11.69 HYPERLIPIDEMIA ASSOCIATED WITH TYPE 2 DIABETES MELLITUS: ICD-10-CM

## 2024-02-22 DIAGNOSIS — E03.9 HYPOTHYROIDISM, UNSPECIFIED TYPE: ICD-10-CM

## 2024-02-22 DIAGNOSIS — N18.30 CONTROLLED TYPE 2 DIABETES MELLITUS WITH STAGE 3 CHRONIC KIDNEY DISEASE, WITH LONG-TERM CURRENT USE OF INSULIN: Primary | ICD-10-CM

## 2024-02-22 DIAGNOSIS — E11.22 CONTROLLED TYPE 2 DIABETES MELLITUS WITH STAGE 3 CHRONIC KIDNEY DISEASE, WITH LONG-TERM CURRENT USE OF INSULIN: Primary | ICD-10-CM

## 2024-02-22 LAB — GLUCOSE SERPL-MCNC: 139 MG/DL (ref 70–110)

## 2024-02-22 PROCEDURE — 99999 PR PBB SHADOW E&M-EST. PATIENT-LVL V: CPT | Mod: PBBFAC,,, | Performed by: NURSE PRACTITIONER

## 2024-02-22 PROCEDURE — 99214 OFFICE O/P EST MOD 30 MIN: CPT | Mod: S$GLB,,, | Performed by: NURSE PRACTITIONER

## 2024-02-22 PROCEDURE — 82962 GLUCOSE BLOOD TEST: CPT | Mod: S$GLB,,, | Performed by: NURSE PRACTITIONER

## 2024-02-22 RX ORDER — BLOOD-GLUCOSE SENSOR
EACH MISCELLANEOUS
Qty: 2 EACH | Refills: 11 | Status: SHIPPED | OUTPATIENT
Start: 2024-02-22

## 2024-02-22 RX ORDER — METFORMIN HYDROCHLORIDE 1000 MG/1
1000 TABLET ORAL 2 TIMES DAILY
Qty: 180 TABLET | Refills: 3 | Status: SHIPPED | OUTPATIENT
Start: 2024-02-22

## 2024-02-22 RX ORDER — EMPAGLIFLOZIN 25 MG/1
25 TABLET, FILM COATED ORAL DAILY
Qty: 90 TABLET | Refills: 3 | Status: SHIPPED | OUTPATIENT
Start: 2024-02-22

## 2024-02-22 RX ORDER — INSULIN DEGLUDEC 200 U/ML
INJECTION, SOLUTION SUBCUTANEOUS
Qty: 3 PEN | Refills: 5 | Status: SHIPPED | OUTPATIENT
Start: 2024-02-22

## 2024-02-22 NOTE — LETTER
AUTHORIZATION FOR RELEASE OF   CONFIDENTIAL INFORMATION        We are seeing Bebe Sandhu, date of birth 1955, in the clinic at No Department Specified. Haroldo Fernandes MD is the patient's PCP. Bebe Sandhu has an outstanding lab/procedure at the time we reviewed her chart. In order to help keep her health information updated, she has authorized us to request the following medical record(s):        (  )  MAMMOGRAM                                      (  )  COLONOSCOPY      (  )  PAP SMEAR                                          ( X )  OUTSIDE LAB RESULTS     (  )  DEXA SCAN                                          (  )  EYE EXAM            (  )  FOOT EXAM                                          (  )  ENTIRE RECORD     (  )  OUTSIDE IMMUNIZATIONS                 (  )  _______________         Please fax records to [facility], Sheri Ammons NP, [phone]     If you have any questions, please contact Suki Murillo at [phone].           Patient Name: Bebe Sandhu  : 1955  Patient Phone #: [phone]

## 2024-02-22 NOTE — PROGRESS NOTES
Subjective:         Patient ID: Bebe Sandhu is a 68 y.o. female.  Patient's current PCP is Haroldo Fernandes MD.     Chief Complaint: Diabetes Mellitus    HPI  Bebe Sandhu is a 68 y.o. Black or  female presenting for a follow up for diabetes. Patient has been diagnosed with type 2 diabetes since 10/2017 .    CURRENT DM MEDICATIONS:   Diabetes Medications               insulin degludec (TRESIBA FLEXTOUCH U-200) 200 unit/mL (3 mL) insulin pen INJECT 14 UNITS SUBCUTANEOUSLY DAILY (BULK)    JARDIANCE 25 mg tablet Take 1 tablet (25 mg total) by mouth once daily.    metFORMIN (GLUCOPHAGE) 1000 MG tablet Take 1 tablet (1,000 mg total) by mouth 2 (two) times daily.     Past failed treatment include: Januvia d'c'd due to elevated lipase    Blood glucose testing is performed regularly. Patient is testing 3 times per day.  Meter:  Continuous per Sigrid - out x 2 months - needs to switch to local pharmacy  Preferred lab: With PCP office- Dr. Fernandes    Any episodes of hypoglycemia? Denies    Complications related to diabetes: nephropathy, retinopathy and peripheral vascular disease    Her blood sugar in the clinic today was:   Lab Results   Component Value Date    POCGLU 139 (A) 02/22/2024     Bebe Sandhu presents today for follow up visit to discuss diabetes management.      She reports she completed blood work recently with PCP- will request. She has been without her Sigrid for a couple months - we discussed now covered through local pharmacy. She would like to upgrade to Sigrid 3. She reports stable blood sugars without hypoglycemia. Tolerating medications well without side effects. Foot exam completed today. She has some R great toe mild redness noted - without pain, drainage, or warmth. She is not sure if she injured her toe.    CKD III- Sees Dr. Schroeder.     GI- Dr. Louis- History of elevated lipase-denies history of pancreatitis. Most recent liver enzymes normal. Pt  Patient : Paula Chirinos Age: 65 year old Sex: male   MRN: [id number] Encounter Date: 12/12/2017      History     Chief Complaint   Patient presents with   • Implantable Defibrillator     HPI 1A/B01A  Pt is Mosotho speaking. History was obtained with the assistance of a .    9:27 PM Paula Chirinos is a 65 year old male who presents to the ED via private transportation c/o CP that began PTA but has since resolved. Pt was sitting at the bar when he became dizzy and his defibrillator fired once. It was replaced six months ago. Prior to being shocked the pt was feeling fine.  He felt fine after shock as well. He is unsure of LOC. No sx since the shock. Pt denies SOB, fever, chills, abd pain, BLE pain, leg swelling, or other associated sx. An ICD pacemaker is currently in place. There are no further complaints or modifying factors at this time.    PCP: Jayson Gonzalez MD  Electrophysiology: Ted Leigh MD  Cardiology: Kip Torres MD    Chart Review- I reviewed the pt's medications, allergies, and past medical and surgical history in Epic. Pt has a h/o ischemic cardiomyopathy with [employer] Biventricular ICD pacemaker, a-fib (anti-coagulated on Warfarin), and h/o ventricular tachycardia.    Allergies   Allergen Reactions   • Aleve    • Aspirin      Pt can tolerate baby aspirin   • Celebrex [Celecoxib] Other (See Comments)     Doesn't know   • Daypro [Oxaprozin]    • Excedrin [Goodys Extra Strength]      All Excedrin     • Feldene [Piroxicam]    • Ibuprofen Other (See Comments)     Doesn't know   • Indocin    • Nsaids        Prior to Admission Medications    ASPIRIN 81 MG TABLET    Take 81 mg by mouth daily.      ATORVASTATIN (LIPITOR) 40 MG TABLET    Take 40 mg by mouth daily.    BENZONATATE (TESSALON PERLES) 100 MG CAPSULE    Take 1 capsule by mouth every 8 hours as needed for Cough.    CARVEDILOL (COREG) 6.25 MG TABLET    Take 1 tablet by mouth 2 times daily (with meals).    DOXYCYCLINE HYCLATE  "states she was told she likely had hepatitis A as a child.     Familial hyperlipidemia- Taking Zetia, Vascepa, and Crestor daily. Also sees cardiologist, Dr. Flores.     Sees Dr. Ragland - Followed for abdominal aneurysm.     Hypothyroidism- She takes Levothyroxine 50 mcg daily.     Vitamin D level -taking Vitamin D 5,000 International Units daily.    Current diet: Diabetic  Activity Level: no structured exercise    Requested recent labs from PCP.    Lab Results   Component Value Date    HGBA1C 5.3 06/02/2020     STANDARDS OF CARE  Diabetes Management Status    Statin: Taking  ACE/ARB: Taking    Screening or Prevention Patient's value Goal Complete/Controlled?   HgA1C Testing and Control   Lab Results   Component Value Date    HGBA1C 5.3 06/02/2020      Annually/Less than 8% No   Lipid profile Most Recent Lipid Panel Health Maintenance Topic Completion: Not Found Annually No   LDL control Lab Results   Component Value Date    LDLCALC 81 06/02/2020    Annually/Less than 100 mg/dl  No   Nephropathy screening No results found for: "LABMICR"  Lab Results   Component Value Date    PROTEINUA Negative 09/23/2021     Lab Results   Component Value Date    UTPCR Unable to calculate 10/12/2020      Annually No   Blood pressure BP Readings from Last 1 Encounters:   02/22/24 95/64    Less than 140/90 Yes   Dilated retinal exam : 04/19/2023 Annually Yes   Foot exam   : 02/22/2024 Annually Yes      Labs reviewed and are noted below.     Lab Results   Component Value Date    WBC 10.59 09/23/2021    HGB 14.5 09/23/2021    HCT 45.5 09/23/2021     09/23/2021    CHOL 165 06/02/2020    TRIG 234 (H) 06/02/2020    HDL 37 (L) 06/02/2020    LDLCALC 81 06/02/2020     09/23/2021    K 4.1 09/23/2021     09/23/2021    ANIONGAP 14 09/23/2021    CREATININE 1.00 10/25/2021    ESTGFRAFRICA 67 10/25/2021    EGFRNONAA 59.3 (A) 09/23/2021    BUN 18 10/25/2021    CO2 28 09/23/2021    TSH 1.182 06/02/2020    GLU 86 09/23/2021    UTPCR " (VIBRAMYCIN) 100 MG CAPSULE    Take 1 capsule by mouth 2 times daily.    FLUOXETINE (PROZAC) 20 MG CAPSULE    Take 20 mg by mouth daily as needed.    FUROSEMIDE (LASIX) 40 MG TABLET    Take 40 mg by mouth 2 times daily.    SACUBITRIL-VALSARTAN (ENTRESTO) 49-51 MG PER TABLET    Take 1 tablet by mouth 2 times daily.    SOTALOL HCL, AF, 120 MG TAB    Take 120 mg by mouth 2 times daily.    SPIRONOLACTONE (ALDACTONE) 25 MG TABLET    Take 0.5 tablets by mouth daily.    WARFARIN (COUMADIN) 5 MG TABLET    Take 7.5 mg by mouth Every evening. Dose: 1 & 1/2 tabs (= 7.5 mg)       Past Medical History:   Diagnosis Date   • Aortic stenosis    • CAD (coronary artery disease)    • Essential (primary) hypertension    • Gallstones    • GI bleed     AFTER ASA AND COUMADIN   • Ischemic dilated cardiomyopathy (CMS/HCC)    • Multiple duodenal ulcers    • Other and unspecified hyperlipidemia    • PAF (paroxysmal atrial fibrillation) (CMS/HCC)    • Peptic ulcer    • Pneumonia, organism unspecified(486)    • Swelling of both lower extremities 02/2017    pain and swelling in  both legs   • Syncope     PRIOR TO CABG   • Thoracic aortic aneurysm (CMS/HCC)     5.2 cm on CTA 1/21/15, Dr. Torres following   • VT (ventricular tachycardia) (CMS/HCC)    • Wears dentures     only wears the uppers       Past Surgical History:   Procedure Laterality Date   • ABDOMEN SURGERY      surgery for  stomach ulcers after heart surgery   • CARDIAC SURGERY  2005    CABG   • ECHOCARDIOGRAM  4/13/2011    EF:25%   • EP ICD IMPLANT  5/23/2012    BIV ICD SJ   • ICD GENERATOR CHANGE/LEAD REVISION  12/08/2016   • PTCA         Family History   Problem Relation Age of Onset   • Stroke Mother        Social History   Substance Use Topics   • Smoking status: Former Smoker     Types: Cigarettes     Quit date: 1/1/2005   • Smokeless tobacco: Not addressed   • Alcohol use No       Review of Systems   Constitutional: Negative for activity change, appetite change, chills and  "Unable to calculate 10/12/2020     Lab Results   Component Value Date    TSH 1.182 06/02/2020    PTH 41.0 08/13/2019    CALCIUM 9.5 09/23/2021    PHOS 3.1 09/23/2021     No results found for: "CPEPTIDE"  No results found for: "GLUTAMICACID"  Glucose   Date Value Ref Range Status   09/23/2021 86 70 - 110 mg/dL Final     Anion Gap   Date Value Ref Range Status   09/23/2021 14 8 - 16 mmol/L Final     eGFR if    Date Value Ref Range Status   09/23/2021 >60.0 >60 mL/min/1.73 m^2 Final     eGFR    Date Value Ref Range Status   10/25/2021 67 >=60 mL/min/1.73mSq Final     eGFR if non    Date Value Ref Range Status   09/23/2021 59.3 (A) >60 mL/min/1.73 m^2 Final     Comment:     Calculation used to obtain the estimated glomerular filtration  rate (eGFR) is the CKD-EPI equation.          The following portions of the patient's history were reviewed and updated as appropriate: allergies, current medications, past family history, past medical history, past social history, past surgical history and problem list.    Review of patient's allergies indicates:   Allergen Reactions    Sulfa (sulfonamide antibiotics) Rash     Social History     Socioeconomic History    Marital status:    Tobacco Use    Smoking status: Every Day     Current packs/day: 1.00     Types: Cigarettes    Smokeless tobacco: Never   Substance and Sexual Activity    Alcohol use: No    Drug use: No    Sexual activity: Not Currently     Past Medical History:   Diagnosis Date    Anemia     Anxiety     Anxiety associated with depression     High cholesterol     Hypertension     Mental disorder      REVIEW OF SYSTEMS:  Eyes History of DRRonni  Cardiovascular: History of HTN and HLD.  GI: History of elevated lipase in the past.  : History of nephropathy.  Neuro: No neuropathy.  PSYCH: Current tobacco use.   ENDO: See HPI.        Objective:      Vitals:    02/22/24 1025   BP: 95/64   Pulse: 94     RESPIRATORY: No " fever.   HENT: Negative for congestion, ear pain, rhinorrhea and sore throat.    Eyes: Negative for pain.   Respiratory: Negative for shortness of breath and wheezing.    Cardiovascular: Negative for chest pain and leg swelling.   Gastrointestinal: Negative for abdominal distention, abdominal pain, anal bleeding, blood in stool, constipation, diarrhea, nausea and vomiting.   Genitourinary: Negative for difficulty urinating, dysuria and frequency.   Musculoskeletal: Negative for back pain, gait problem and neck pain.   Skin: Negative for rash.   Neurological: Negative for dizziness, syncope, numbness and headaches.   Psychiatric/Behavioral: Negative.      Physical Exam     ED Triage Vitals [12/12/17 2110]   ED Triage Vitals Group      Temp 97 °F (36.1 °C)      Pulse 86      Resp 18      /84      SpO2 96 %      EtCO2 mmHg       Height 5' 9\" (1.753 m)      Weight 281 lb 15.5 oz (127.9 kg)      Weight Scale Used ED Estimate       Physical Exam   Constitutional: He is oriented to person, place, and time. He appears well-developed and well-nourished. No distress.   Obese  Well appearing   HENT:   Head: Normocephalic and atraumatic.   Eyes: Conjunctivae and EOM are normal. Pupils are equal, round, and reactive to light.   Neck: Normal range of motion. Neck supple.   Cardiovascular: Normal rate, regular rhythm, normal heart sounds and intact distal pulses.    No murmur heard.  Pulmonary/Chest: Effort normal and breath sounds normal. No respiratory distress. He has no wheezes. He has no rales.   Pacemaker palpable in L upper anterior chest  Surgical incision well healed   Abdominal: Soft. Bowel sounds are normal. He exhibits no distension. There is no tenderness. There is no rebound.   Musculoskeletal: He exhibits no edema.   Neurological: He is alert and oriented to person, place, and time.   Skin: Skin is warm. No rash noted. He is not diaphoretic.   Psychiatric: He has a normal mood and affect.   Nursing note and  respiratory distress  NEUROLOGIC: Cranial nerves II-XII grossly intact.   PSYCHIATRIC: Alert & oriented x3. Normal mood and affect.  FOOT EXAMINATION: Protective Sensation (w/ 10 gram monofilament):  Right: Intact  Left: Intact    Visual Inspection:  Normal -  Bilateral, Nails Intact - without Evidence of Foot Deformity- Bilateral, Callus -  Bilateral, and some mild redness to R great toe without warmth,pain,or drainage. Will monitor.     Pedal Pulses:   Right: Present  Left: Present    Posterior Tibialis Pulses:   Right:Present  Left: Present    Assessment:       1. Controlled type 2 diabetes mellitus with stage 3 chronic kidney disease, with long-term current use of insulin    2. Hyperlipidemia associated with type 2 diabetes mellitus    3. Essential hypertension    4. Hypothyroidism, unspecified type    5. PVD (peripheral vascular disease)    6. Abdominal aneurysm    7. Vitamin D deficiency        Plan:   Bebe was seen today for diabetes mellitus.    Diagnoses and all orders for this visit:    Controlled type 2 diabetes mellitus with stage 3 chronic kidney disease, with long-term current use of insulin  -     POCT Glucose, Hand-Held Device  -     Stand InSTRebelMouse LAINEY 3 SENSOR Torrie; Change sensor every 14 days  -     insulin degludec (TRESIBA FLEXTOUCH U-200) 200 unit/mL (3 mL) insulin pen; INJECT 14 UNITS SUBCUTANEOUSLY DAILY (BULK)  -     JARDIANCE 25 mg tablet; Take 1 tablet (25 mg total) by mouth once daily.  -     metFORMIN (GLUCOPHAGE) 1000 MG tablet; Take 1 tablet (1,000 mg total) by mouth 2 (two) times daily.    Chronic -     Continue Tresiba 14 units once a day.     Continue Metformin     Continue Jardiance to 25 mg daily.    Hyperlipidemia associated with type 2 diabetes mellitus    Essential hypertension    Hypothyroidism, unspecified type    PVD (peripheral vascular disease)    Abdominal aneurysm    Vitamin D deficiency      - Follow up:  6 months    Portions of this note may have been created with voice  vitals reviewed.    ED Course     Procedures    Lab Results     Results for orders placed or performed during the hospital encounter of 12/12/17   CBC & Auto Differential   Result Value Ref Range    WBC 7.0 4.2 - 11.0 K/mcL    RBC 4.67 4.50 - 5.90 mil/mcL    HGB 14.4 13.0 - 17.0 g/dL    HCT 42.0 39.0 - 51.0 %    MCV 89.9 78.0 - 100.0 fl    MCH 30.8 26.0 - 34.0 pg    MCHC 34.3 32.0 - 36.5 g/dL    RDW-CV 13.6 11.0 - 15.0 %     140 - 450 K/mcL    DIFF TYPE AUTO DIFF verified by manual smear review.     Neutrophil 53 %    LYMPH 31 %    MONO 9 %    EOSIN 6 %    BASO 1 %    Absolute Neutrophil 3.8 1.8 - 7.7 K/mcL    Absolute Lymph 2.1 1.0 - 4.0 K/mcL    Absolute Mono 0.6 0.3 - 0.9 K/mcL    Absolute Eos 0.4 0.1 - 0.5 K/mcL    Absolute Baso 0.0 0.0 - 0.3 K/mcL   Comprehensive Metabolic Panel   Result Value Ref Range    Sodium 142 135 - 145 mmol/L    Potassium 3.5 3.4 - 5.1 mmol/L    Chloride 104 98 - 107 mmol/L    Carbon Dioxide 31 21 - 32 mmol/L    Anion Gap 10 10 - 20 mmol/L    Glucose 142 (H) 65 - 99 mg/dL    BUN 16 6 - 20 mg/dL    Creatinine 0.86 0.67 - 1.17 mg/dL    GFR Estimate,  >90     GFR Estimate, Non African American >90     BUN/Creatinine Ratio 19 7 - 25    CALCIUM 8.5 8.4 - 10.2 mg/dL    TOTAL BILIRUBIN 0.5 0.2 - 1.0 mg/dL    AST/SGOT 39 (H) <38 Units/L    ALT/SGPT 52 <79 Units/L    ALK PHOSPHATASE 53 45 - 117 Units/L    TOTAL PROTEIN 7.9 6.4 - 8.2 g/dL    Albumin 3.8 3.6 - 5.1 g/dL    GLOBULIN 4.1 (H) 2.0 - 4.0 g/dL    A/G Ratio, Serum 0.9 (L) 1.0 - 2.4   Partial Thromboplastin Time   Result Value Ref Range    PTT 33 (H) 22 - 30 sec   Prothrombin Time   Result Value Ref Range    PROTIME 23.6 (H) 9.7 - 11.8 sec    INR 2.4    Magnesium Level   Result Value Ref Range    MAGNESIUM 1.7 1.7 - 2.4 mg/dL   Magnesium Level   Result Value Ref Range    MAGNESIUM 1.6 (L) 1.7 - 2.4 mg/dL   Chem 8 Panel - Point of Care   Result Value Ref Range    Sodium  135 - 145 mmol/L    Potassium POC 3.4  "recognition software. Occasional "wrong-word" or "sound-a-like" substitutions may have occurred due to the inherent limitations of voice recognition software. Please, read the note carefully and recognize, using context, where substitutions have occurred.           BELA Moses, BC-ADM [facility]  " 3.4 - 5.1 mmol/L    Chloride  98 - 107 mmol/L    CALCIUM IONIZED-POC 1.12 (L) 1.15 - 1.29 mmol/L    CO2 Total 28 (H) 19 - 24 mmol/L    GLUCOSE  (H) 65 - 99 mg/dL    BUN POC 16 6 - 20 mg/dL    HEMATOCRIT POC 42.0 39.0 - 51.0 %    Hemoglobin POC 14.3 13.0 - 17.0 g/dL    ANION GAP POC 18 mmol/L    Creatinine POC 1.10 0.67 - 1.17 mg/dL    Estimated GFR  (POC) 81     Estimated GFR Non- (POC) 70    Troponin I - Point of Care   Result Value Ref Range    Troponin I POC <0.10 <0.10 ng/mL       Radiology Results     Imaging Results          XR Chest AP or PA (Final result)  Result time 12/12/17 21:59:17    Final result                 Impression:    IMPRESSION:      Mild pulmonary venous congestion.    I have reviewed the images and agree with the Resident interpretation.               Narrative:    XR CHEST AP OR PA 12/12/2017 9:53 PM    HISTORY: Chest pain.     COMPARISON:  Radiograph of the chest 11/14/2017.    TECHNIQUE:  Single, AP upright view of the chest.    FINDINGS:    Reinserted left chest AICD, median sternotomy wires and cardiac  valvuloplasty.    The heart is enlarged. The pulmonary vasculature is slightly indistinct.  Mild interstitial prominence without focal consolidation. No pleural  effusion or pneumothorax.    No acute osseous findings.                    Preliminary result                 Impression:    IMPRESSION:      Mild pulmonary venous congestion.    I have reviewed the images and agree with the Resident interpretation.               Narrative:    XR CHEST AP OR PA 12/12/2017 9:53 PM    HISTORY: Chest pain.     COMPARISON:  Radiograph of the chest 11/14/2017.    TECHNIQUE:  Single, AP upright view of the chest.    FINDINGS:    Reinserted left chest AICD, median sternotomy wires and cardiac  valvuloplasty.    The heart is enlarged. The pulmonary vasculature is slightly indistinct.  Mild interstitial prominence without focal consolidation. No  pleural  effusion or pneumothorax.    No acute osseous findings.                                ED Medication Orders     Start Ordered     Status Ordering Provider    12/12/17 2241 12/12/17 2240  magnesium sulfate 2 g in 50 mL premix IVPB  ONCE      Acknowledged MIKA CHANG    12/12/17 2241 12/12/17 2240  potassium chloride (KLOR-CON) packet 40 mEq  ONCE      Acknowledged MIKA CHANG    12/12/17 2241 12/12/17 2240  potassium chloride 20 mEq/100mL IVPB premix  ONCE      Acknowledged MIKA CHANG          [facility]    Vitals  Vitals:    12/12/17 2110 12/12/17 2130 12/12/17 2215   BP: 135/84 139/80    Pulse: 86 82 83   Resp: 18 29 20   Temp: 97 °F (36.1 °C)     TempSrc: Oral     SpO2: 96% 95% 94%   Weight: 127.9 kg     Height: 5' 9\" (1.753 m)         ED Course  Initial Impression: I performed the initial assessment and evaluation of the pt. Pt presents after defib firing. Pt reports defibrillator firing once. No sx prior or following the shock. VS are wnl. Pulse ox is 94% on RA. We discussed the plan to evaluate with blood work, CXR, and an EKG. Pt agrees with plan and will be re-assessed shortly.     10:22 PM RN update- I spoke with the pacer RN. Pt had an episode of v-fib lasting for 18 seconds that was converted with one shock to NSR.    10:37 PM Consult- I spoke with Dr. Dionisio Brizuela (Electrophysiology, covering for Dr. Leigh) regarding the patient's presentation, and the ED work up. Pt has a h/o ischemic cardiomyopathy. 18 second run of v-fib tonight with one shock. Since the shock pt has been fine. Current medications discussed. Magnesium is 1.6 and potassium is 3.5. We further discussed and collaboratively agreed upon the plan of care. He recommends 2 grams IV Magnesium as Magnesium is low and 20 mEQ Potassium IV and 40 mEQ Potassium PO prior to leaving. He is able to f/u with Dr. Leigh outside the ED.  He reports no indication for further care beyond the ED.    11:19 PM Pt recheck- Pt rechecked and is  sitting up in bed. Pt reports feeling fine. Pacemaker was evaluated. Pt appeared to be in v-fib which caused the shock. Pt is receiving Potassium and Magnesium and will then be able to go home if no further episodes. Pt's wife has arrived and I have updated her. Discussed pt is more prone to abnormal heart rhythms due to cardiac hx.  I have spoke with Dr. Leigh's colleague Dr. Brizuela. Pt will need to f/u with him tomorrow. The pt understands and agrees with the plan of care. All questions were addressed.     MDM  Pt with single shock of defib, after run of V fib.  Denies any symptoms, and no further shock since single episode.  Electrolytes optimized in ED.  Discussed with EP on call who agrees with plan of care and plan for close follow up with Dr. Leigh tomorrow.     Critical Care time spent on this patient outside of billable procedures:  None    Patient care signed out to Dr. Aaron Morgan at the end of my shift. Sign-out included relevant details of history, physical, and work-up to date. They will follow up on final disposition after IV infusions.    I have reviewed the information recorded by the scribe for accuracy and agree with its contents.    ____________________________________________________________________    Ismael Reddy acting as a scribe for Dr. Everardo Cruz  Physician # [id number]  Scribe: Ismael Cruz MD  12/13/17 5162

## 2024-03-12 DIAGNOSIS — Z79.4 CONTROLLED TYPE 2 DIABETES MELLITUS WITH STAGE 3 CHRONIC KIDNEY DISEASE, WITH LONG-TERM CURRENT USE OF INSULIN: ICD-10-CM

## 2024-03-12 DIAGNOSIS — N18.30 CONTROLLED TYPE 2 DIABETES MELLITUS WITH STAGE 3 CHRONIC KIDNEY DISEASE, WITH LONG-TERM CURRENT USE OF INSULIN: ICD-10-CM

## 2024-03-12 DIAGNOSIS — E11.22 CONTROLLED TYPE 2 DIABETES MELLITUS WITH STAGE 3 CHRONIC KIDNEY DISEASE, WITH LONG-TERM CURRENT USE OF INSULIN: ICD-10-CM

## 2024-03-12 RX ORDER — PEN NEEDLE, DIABETIC 30 GX3/16"
NEEDLE, DISPOSABLE MISCELLANEOUS
Qty: 100 EACH | Refills: 3 | Status: SHIPPED | OUTPATIENT
Start: 2024-03-12

## 2024-03-12 NOTE — TELEPHONE ENCOUNTER
"----- Message from Kaycee Milian sent at 3/12/2024 12:17 PM CDT -----  Contact: Bebe Otto is calling in regards to getting a refill on pen needle, diabetic (BD ULTRA-FINE IAN PEN NEEDLE) 32 gauge x 5/32" Ndle.please call back at .[phone]       [facility] - [facility] [address]  [address] [place] [id number]  Phone: [phone] Fax: [phone]    Thanks  [facility]    "

## 2024-06-13 ENCOUNTER — TELEPHONE (OUTPATIENT)
Dept: DIABETES | Facility: CLINIC | Age: 69
End: 2024-06-13
Payer: MEDICARE

## 2024-06-13 DIAGNOSIS — J44.9 CHRONIC OBSTRUCTIVE PULMONARY DISEASE, UNSPECIFIED COPD TYPE: Primary | ICD-10-CM

## 2024-06-13 NOTE — TELEPHONE ENCOUNTER
Called pt back and asked her to have her PCP  refill Rx's. She said she sees a pulmonologist at [facility] and the message was sent to the wrong provider. I will forward request to her pulmonologist.

## 2024-06-13 NOTE — TELEPHONE ENCOUNTER
----- Message from Misael Carrasco sent at 6/13/2024  1:17 PM CDT -----  Contact: lokesh  Type:  RX Refill Request    Who Called: Lokesh  Refill or New Rx:refill  RX Name and Strength:albuterol (PROVENTIL/VENTOLIN HFA) 90 mcg/actuation inhaler   How is the patient currently taking it? (ex. 1XDay):unknown  Is this a 30 day or 90 day RX:unknown  Preferred Pharmacy with phone number:  [facility] - [place], [place] - [address]  [address]  [facility] [address]  Phone: [phone] Fax: [phone]  Local or Mail Order:Local  Ordering Provider:renny camarillo  Would the patient rather a call back or a response via [facility]? Call back  Best Call Back Number:[phone]  Additional Information:     Who Called: Lokesh  Refill or New Rx:refill  RX Name and Strength:albuterol-ipratropium (DUO-NEB) 2.5 mg-0.5 mg/3 mL nebulizer solution   How is the patient currently taking it? (ex. 1XDay):unknown  Is this a 30 day or 90 day RX:unknown  Preferred Pharmacy with phone number:  [facility] - [place], [place] - [address]  [address]  [facility] [address]  Phone: [phone] Fax: [phone]  Local or Mail Order:Local  Ordering Provider:renny camarillo  Would the patient rather a call back or a response via [facility]? Call back  Best Call Back Number:[phone]  Additional Information:

## 2024-06-14 RX ORDER — ALBUTEROL SULFATE 90 UG/1
1 AEROSOL, METERED RESPIRATORY (INHALATION) EVERY 6 HOURS PRN
Qty: 6.7 G | Refills: 11 | Status: SHIPPED | OUTPATIENT
Start: 2024-06-14 | End: 2024-06-19 | Stop reason: SDUPTHER

## 2024-06-19 DIAGNOSIS — J44.9 CHRONIC OBSTRUCTIVE PULMONARY DISEASE, UNSPECIFIED COPD TYPE: ICD-10-CM

## 2024-06-19 RX ORDER — ALBUTEROL SULFATE 90 UG/1
1 AEROSOL, METERED RESPIRATORY (INHALATION) EVERY 6 HOURS PRN
Qty: 6.7 G | Refills: 11 | Status: SHIPPED | OUTPATIENT
Start: 2024-06-19

## 2024-07-22 ENCOUNTER — OFFICE VISIT (OUTPATIENT)
Dept: PULMONOLOGY | Facility: CLINIC | Age: 69
End: 2024-07-22
Payer: MEDICARE

## 2024-07-22 ENCOUNTER — CLINICAL SUPPORT (OUTPATIENT)
Dept: PULMONOLOGY | Facility: CLINIC | Age: 69
End: 2024-07-22
Payer: MEDICARE

## 2024-07-22 VITALS
SYSTOLIC BLOOD PRESSURE: 160 MMHG | WEIGHT: 167.44 LBS | WEIGHT: 167 LBS | HEART RATE: 60 BPM | DIASTOLIC BLOOD PRESSURE: 60 MMHG | BODY MASS INDEX: 31.53 KG/M2 | HEIGHT: 61 IN | HEIGHT: 61 IN | RESPIRATION RATE: 20 BRPM | OXYGEN SATURATION: 95 % | BODY MASS INDEX: 31.61 KG/M2

## 2024-07-22 DIAGNOSIS — J44.9 CHRONIC OBSTRUCTIVE PULMONARY DISEASE, UNSPECIFIED COPD TYPE: ICD-10-CM

## 2024-07-22 DIAGNOSIS — E66.01 SEVERE OBESITY (BMI 35.0-35.9 WITH COMORBIDITY): ICD-10-CM

## 2024-07-22 DIAGNOSIS — R09.02 EXERCISE HYPOXEMIA: Primary | ICD-10-CM

## 2024-07-22 DIAGNOSIS — I10 PRIMARY HYPERTENSION: ICD-10-CM

## 2024-07-22 PROCEDURE — 3077F SYST BP >= 140 MM HG: CPT | Mod: CPTII,S$GLB,, | Performed by: PHYSICIAN ASSISTANT

## 2024-07-22 PROCEDURE — 99999 PR PBB SHADOW E&M-EST. PATIENT-LVL V: CPT | Mod: PBBFAC,,, | Performed by: PHYSICIAN ASSISTANT

## 2024-07-22 PROCEDURE — 3008F BODY MASS INDEX DOCD: CPT | Mod: CPTII,S$GLB,, | Performed by: PHYSICIAN ASSISTANT

## 2024-07-22 PROCEDURE — 94618 PULMONARY STRESS TESTING: CPT | Mod: S$GLB,,, | Performed by: INTERNAL MEDICINE

## 2024-07-22 PROCEDURE — 1160F RVW MEDS BY RX/DR IN RCRD: CPT | Mod: CPTII,S$GLB,, | Performed by: PHYSICIAN ASSISTANT

## 2024-07-22 PROCEDURE — 4010F ACE/ARB THERAPY RXD/TAKEN: CPT | Mod: CPTII,S$GLB,, | Performed by: PHYSICIAN ASSISTANT

## 2024-07-22 PROCEDURE — 94010 BREATHING CAPACITY TEST: CPT | Mod: 59,S$GLB,, | Performed by: INTERNAL MEDICINE

## 2024-07-22 PROCEDURE — 1159F MED LIST DOCD IN RCRD: CPT | Mod: CPTII,S$GLB,, | Performed by: PHYSICIAN ASSISTANT

## 2024-07-22 PROCEDURE — 99214 OFFICE O/P EST MOD 30 MIN: CPT | Mod: 25,S$GLB,, | Performed by: PHYSICIAN ASSISTANT

## 2024-07-22 PROCEDURE — 3078F DIAST BP <80 MM HG: CPT | Mod: CPTII,S$GLB,, | Performed by: PHYSICIAN ASSISTANT

## 2024-07-22 RX ORDER — LISINOPRIL 10 MG/1
10 TABLET ORAL
COMMUNITY
Start: 2024-02-01

## 2024-07-22 RX ORDER — UMECLIDINIUM BROMIDE AND VILANTEROL TRIFENATATE 62.5; 25 UG/1; UG/1
1 POWDER RESPIRATORY (INHALATION) DAILY
Qty: 60 EACH | Refills: 11 | Status: SHIPPED | OUTPATIENT
Start: 2024-07-22

## 2024-07-22 RX ORDER — LEVALBUTEROL TARTRATE 45 UG/1
2 AEROSOL, METERED ORAL EVERY 4 HOURS PRN
Qty: 15 G | Refills: 11 | Status: SHIPPED | OUTPATIENT
Start: 2024-07-22 | End: 2025-07-22

## 2024-07-22 RX ORDER — PREDNISONE 20 MG/1
TABLET ORAL
Qty: 20 TABLET | Refills: 0 | Status: SHIPPED | OUTPATIENT
Start: 2024-07-22 | End: 2024-07-22

## 2024-07-22 RX ORDER — PREDNISONE 20 MG/1
20 TABLET ORAL 2 TIMES DAILY
Qty: 10 TABLET | Refills: 0 | Status: SHIPPED | OUTPATIENT
Start: 2024-07-22

## 2024-07-22 RX ORDER — HYDROCODONE BITARTRATE AND ACETAMINOPHEN 5; 325 MG/1; MG/1
1 TABLET ORAL EVERY 8 HOURS
COMMUNITY

## 2024-07-22 NOTE — PROGRESS NOTES
Pulmonary Outpatient Follow Up Visit     Subjective:       Patient ID: Bebe Sandhu is a 68 y.o. female.    Chief Complaint: Shortness of Breath    7/22/24  Here for 6 month COPD follow up  Since last visit she ran out of Anoro and did not get it refilled  Has been off Anoro for a few months  Has noticed worsening shortness of breath since summer started, worse when she is outside in the heat  Some relief with BALTAZAR  Sleeps with 2L oxygen from [place]  Completed walk, hayde today    Went to ER in May for dehydration, Chest X Ray was normal    12/7/23  Here for 6 month follow up for COPD  06/08/2023 smoking few cigarettes.  On nicotine patches.  Follow-up with smoking cessation.  Continuing to make effort to quit smoking.    On albuterol p.r.n. and Anoro 1 puff daily.    Doing well without signs of exacerbation  Sleeps with 2L oxygen  She is scheduled next month for sleep study outside [facility] with ENT    6/8/23  Patient is a 67 y.o. female presenting for 3 months follow-up.      06/08/2023 smoking few cigarettes.  On nicotine patches.  Follow-up with smoking cessation.  Continuing to make effort to quit smoking.      On albuterol p.r.n. and Anoro 1 puff daily.      Moderately severe COPD noted on spirometry.      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      Initially evaluated March 2023 and she is known with  more than 30 pack year     On albuterol p.r.n. Anoro 1 puff daily.  Recent pneumonia [place] 2022.  On O2 during sleep and on exertion.  Making effort to quit smoking with nicotine replacement and Wellbutrin.    Previously was seeing pulmonology at [facility] last seen Dr. Andrea Jaquez.      Known with moderately severe COPD as per PFT in 2018.  Low-dose CT scan done [facility] reviewed.  2022.        Review of Systems   Constitutional:  Positive for fatigue. Negative for fever and chills.    HENT:  Negative for nosebleeds.    Eyes:  Negative for redness.   Respiratory:  Positive for cough and dyspnea on extertion. Negative for choking, shortness of breath and use of rescue inhaler.    Cardiovascular:  Negative for chest pain.   Genitourinary:  Negative for hematuria.   Endocrine:  Negative for cold intolerance.    Musculoskeletal:  Positive for arthralgias.   Gastrointestinal:  Negative for vomiting.   Neurological:  Negative for syncope.   Hematological:  Negative for adenopathy.   Psychiatric/Behavioral:  Negative for confusion.        Outpatient Encounter Medications as of 7/22/2024   Medication Sig Dispense Refill    lisinopriL 10 MG tablet Take 10 mg by mouth.      albuterol (PROVENTIL/VENTOLIN HFA) 90 mcg/actuation inhaler Inhale 1 puff into the lungs every 6 (six) hours as needed for Wheezing. Rescue 6.7 g 11    albuterol-ipratropium (DUO-NEB) 2.5 mg-0.5 mg/3 mL nebulizer solution Take by nebulization.      alendronate (FOSAMAX) 70 MG tablet Take 70 mg by mouth every 7 days.      alprazolam (XANAX) 1 MG tablet Take 1 mg by mouth 2 (two) times daily.      amLODIPine (NORVASC) 5 MG tablet Take 2.5 mg by mouth.      ANORO ELLIPTA 62.5-25 mcg/actuation DsDv Inhale 1 puff into the lungs once daily. 60 each 11    aspirin 81 MG Chew Take 81 mg by mouth once daily.      cholecalciferol, vitamin D3, 125 mcg (5,000 unit) Tab Take 1 tablet by mouth.      citalopram (CELEXA) 20 MG tablet Take 20 mg by mouth every morning.  1    cloNIDine (CATAPRES) 0.2 MG tablet Take 0.2 mg by mouth once.      ezetimibe (ZETIA) 10 mg tablet Take 10 mg by mouth.      famotidine (PEPCID) 20 MG tablet Take 20 mg by mouth nightly as needed for Heartburn.      ferrous sulfate (FEOSOL) 325 mg (65 mg iron) Tab tablet 1 tablet      flash glucose sensor (FREESTYLE LAINEY 2 SENSOR) Kit Change sensor every 14 days 6 kit 3    folic acid (FOLVITE) 1 MG tablet Take 1 mg by mouth.      FREESTYLE LAINEY 3 SENSOR Torrie Change sensor every 14  "days 2 each 11    furosemide (LASIX) 20 MG tablet Take 20 mg by mouth once daily.      hydrochlorothiazide (HYDRODIURIL) 25 MG tablet Take 25 mg by mouth once daily.      HYDROcodone-acetaminophen (NORCO) 5-325 mg per tablet Take 1 tablet by mouth every 8 (eight) hours.      icosapent ethyl 1 gram Cap Take 2 g by mouth.      insulin degludec (TRESIBA FLEXTOUCH U-200) 200 unit/mL (3 mL) insulin pen INJECT 14 UNITS SUBCUTANEOUSLY DAILY (BULK) 3 pen 5    JARDIANCE 25 mg tablet Take 1 tablet (25 mg total) by mouth once daily. 90 tablet 3    levalbuterol (XOPENEX HFA) 45 mcg/actuation inhaler Inhale 2 puffs into the lungs every 4 (four) hours as needed for Wheezing or Shortness of Breath. 15 g 11    levothyroxine (SYNTHROID) 50 MCG tablet Take 50 mcg by mouth.      loratadine (CLARITIN) 10 mg tablet Take 10 mg by mouth once daily.      meloxicam (MOBIC) 7.5 MG tablet Take 7.5 mg by mouth once daily.      metFORMIN (GLUCOPHAGE) 1000 MG tablet Take 1 tablet (1,000 mg total) by mouth 2 (two) times daily. 180 tablet 3    mirtazapine (REMERON) 7.5 MG Tab Take 7.5 mg by mouth.      nicotine (NICODERM CQ) 14 mg/24 hr Place 1 patch onto the skin once daily. 14 patch 0    nicotine polacrilex 4 MG Lozg Take 4 mg by mouth as needed.      pantoprazole (PROTONIX) 20 MG tablet Take 20 mg by mouth every morning.      pen needle, diabetic (BD ULTRA-FINE IAN PEN NEEDLE) 32 gauge x 5/32" Ndle USE with tresiba DAILY (BULK) 100 each 3    pneumoc 20-reena conj-dip cr,PF, (PREVNAR 20, PF,) 0.5 mL Syrg injection Inject 0.5 ml into the muscle. 0.5 mL 0    potassium chloride SA (K-DUR,KLOR-CON) 20 MEQ tablet Take 20 mEq by mouth once daily.      predniSONE (DELTASONE) 20 MG tablet Take 1 tablet (20 mg total) by mouth 2 (two) times daily. 10 tablet 0    quinapril (ACCUPRIL) 20 MG tablet Take 20 mg by mouth every evening.      rosuvastatin (CRESTOR) 10 MG tablet Take 10 mg by mouth once daily.      rosuvastatin (CRESTOR) 20 MG tablet Take 20 mg by " "mouth.      thiamine 100 MG tablet Take by mouth.      venlafaxine (EFFEXOR-XR) 75 MG 24 hr capsule Take 75 mg by mouth once daily.      vitamin E 600 UNIT capsule Take 1 capsule by mouth once daily.      [DISCONTINUED] ANORO ELLIPTA 62.5-25 mcg/actuation DsDv Inhale 1 puff into the lungs once daily.      [DISCONTINUED] levalbuterol (XOPENEX HFA) 45 mcg/actuation inhaler Inhale 2 puffs into the lungs every 4 (four) hours as needed.      [DISCONTINUED] predniSONE (DELTASONE) 20 MG tablet Take 3 tablets (60 mg total) by mouth once daily for 3 days, THEN 2 tablets (40 mg total) once daily for 3 days, THEN 1 tablet (20 mg total) once daily for 3 days, THEN 0.5 tablets (10 mg total) once daily for 3 days. 20 tablet 0     No facility-administered encounter medications on file as of 7/22/2024.       Objective:     Vital Signs (Most Recent)  Vital Signs  Pulse: 60  Resp: 20  SpO2: 95 %  BP: (!) 160/60  Height and Weight  Height: 5' 1" (154.9 cm)  Weight: 75.8 kg (167 lb)  BSA (Calculated - sq m): 1.81 sq meters  BMI (Calculated): 31.6  Weight in (lb) to have BMI = 25: 132]  Wt Readings from Last 2 Encounters:   07/22/24 75.8 kg (167 lb)   07/22/24 76 kg (167 lb 7 oz)       Physical Exam   Constitutional: She is oriented to person, place, and time. She appears well-developed. No distress.   HENT:   Head: Normocephalic.   Cardiovascular: Normal rate.   Pulmonary/Chest: Normal expansion and effort normal. No stridor. No respiratory distress. She has wheezes. She has no rhonchi. She exhibits no tenderness.   Abdominal: She exhibits no distension.   Musculoskeletal:         General: No tenderness.      Cervical back: Neck supple.   Lymphadenopathy:     She has no cervical adenopathy.   Neurological: She is alert and oriented to person, place, and time. Gait normal.   Skin: Skin is warm.   Psychiatric: She has a normal mood and affect. Her behavior is normal. Judgment and thought content normal.   Nursing note and vitals " "reviewed.      Laboratory  Lab Results   Component Value Date    WBC 10.59 09/23/2021    RBC 4.90 09/23/2021    HGB 14.5 09/23/2021    HCT 45.5 09/23/2021    MCV 93 09/23/2021    MCH 29.6 09/23/2021    MCHC 31.9 (L) 09/23/2021    RDW 13.1 09/23/2021     09/23/2021    MPV 9.6 09/23/2021    GRAN 7.6 09/23/2021    GRAN 72.1 09/23/2021    LYMPH 2.3 09/23/2021    LYMPH 21.4 09/23/2021    MONO 0.4 09/23/2021    MONO 3.9 (L) 09/23/2021    EOS 0.1 09/23/2021    BASO 0.10 09/23/2021    EOSINOPHIL 1.2 09/23/2021    BASOPHIL 0.9 09/23/2021       BMP  Lab Results   Component Value Date     09/23/2021    K 4.1 09/23/2021     09/23/2021    CO2 28 09/23/2021    BUN 18 10/25/2021    CREATININE 1.00 10/25/2021    CALCIUM 9.5 09/23/2021    ANIONGAP 14 09/23/2021    ESTGFRAFRICA 67 10/25/2021    EGFRNONAA 59.3 (A) 09/23/2021       No results found for: "BNP"    Lab Results   Component Value Date    TSH 1.182 06/02/2020       CT Chest Lung Screening Low Dose  Narrative: EXAMINATION:  CT CHEST LUNG SCREENING LOW DOSE    CLINICAL HISTORY:  30 py SMOKER; Nicotine dependence, cigarettes, uncomplicated    TECHNIQUE:  CT of the thorax was performed with low dose, lung screening protocol.  No contrast was administered.  Sagittal and coronal reconstructions were obtained.  All CT scans at this facility are performed using dose optimization techniques including the following: automated exposure control; adjustment of the mA and/or kV; use of iterative reconstruction technique.    COMPARISON:  Chest radiograph 06/07/2023    FINDINGS:  Lungs: There are no abnormal opacities that require further evaluation.  There is a subpleural solid nodule within the left anterior lobe, measuring 0.5 cm on series 4, image 165.  The lungs show no findings consistent with emphysema.    Pleura:   No effusion..    Heart and pericardium: Normal size without effusion.    Aorta and vasculature: Atherosclerosis including coronary arteries.    Chest " wall and skeletal structures: Unremarkable except age-appropriate degenerative changes.    Upper abdomen: Unremarkable.  Impression: Lung-RADS Category:  2 - Benign Appearance or Behavior - continue annual screening with LDCT in 12 months.    Clinically or potentially clinically significant non lung cancer finding:  None.    Prior Lung Cancer Modifier:  No history of prior lung cancer.    Electronically signed by: Jaida Allen  Date:    11/09/2023  Time:    12:06    Phase Oxygen Assessment Supplemental O2 Heart   Rate Blood Pressure Mnada Dyspnea Scale Rating   Resting 92 % Room Air 99 bpm 160/65 3   Exercise             Minute             1 90 % Room Air 107 bpm       2 88 % Room Air 112 bpm       3 92 % 2 L/M 109 bpm       4 91 % 2 L/M 114 bpm       5 91 % 2 L/M 117 bpm       6  94 % 2 L/M 107 bpm (!) 210/78 5-6   Recovery             Minute             1 94 % 2 L/M 107 bpm       2 94 % 2 L/M 106 bpm       3 95 % 2 L/M 105 bpm       4 96 % 2 L/M 101 bpm 160/60 3      Six Minute Walk Summary  6MWT Status: completed without stopping  Patient Reported:  (leg weakness)          Interpretation:  Did the patient stop or pause?: No  Total Time Walked (Calculated): 360 seconds  Final Partial Lap Distance (feet): 100 feet  Total Distance Meters (Calculated): 335.28 meters  Predicted Distance Meters (Calculated): 426.87 meters  Percentage of Predicted (Calculated): 78.54  Peak VO2 (Calculated): 14.04  Mets: 4.01  Has The Patient Had a Previous Six Minute Walk Test?: Yes     Previous 6MWT Results  Has The Patient Had a Previous Six Minute Walk Test?: Yes  Date of Previous Test: 04/06/23  Total Time Walked: 360 seconds  Total Distance (meters): 426.72  Predicted Distance (meters): 418.33 meters  Percentage of Predicted: 102.01  Percent Change (Calculated): 0.21      Assessment/Plan:   Exercise hypoxemia  -     OXYGEN FOR HOME USE    Chronic obstructive pulmonary disease, unspecified COPD type  -     Discontinue:  predniSONE (DELTASONE) 20 MG tablet; Take 3 tablets (60 mg total) by mouth once daily for 3 days, THEN 2 tablets (40 mg total) once daily for 3 days, THEN 1 tablet (20 mg total) once daily for 3 days, THEN 0.5 tablets (10 mg total) once daily for 3 days.  Dispense: 20 tablet; Refill: 0  -     OXYGEN FOR HOME USE  -     ANORO ELLIPTA 62.5-25 mcg/actuation DsDv; Inhale 1 puff into the lungs once daily.  Dispense: 60 each; Refill: 11  -     levalbuterol (XOPENEX HFA) 45 mcg/actuation inhaler; Inhale 2 puffs into the lungs every 4 (four) hours as needed for Wheezing or Shortness of Breath.  Dispense: 15 g; Refill: 11  -     predniSONE (DELTASONE) 20 MG tablet; Take 1 tablet (20 mg total) by mouth 2 (two) times daily.  Dispense: 10 tablet; Refill: 0    Severe obesity (BMI 35.0-35.9 with comorbidity)    Primary hypertension      Refill ANORO and BALTAZAR hfa  Portable O2 concentrator ordered through [facility], 2L with activity and sleep  Some wheezing on exam, will give 40mg prednisone x 5 days  Diabetic precautions, Risk and benefits of steroid therapy in diabetic patient were reviewed, patient is cautioned to monitor blood sugars and discontinue steroid therapy for any elevations above 300.   Elevated blood pressure: Take medications as prescribed. Keep daily blood pressure log at least twice a day and follow up with PCP in 2 weeks  ER for any worsening symptoms or bp >180/100    Follow up in about 3 months (around 10/22/2024) for COPD follow up.    This note was prepared using voice recognition system and is likely to have sound alike errors that may have been overlooked even after proof reading.  Please call me with any questions    Discussed diagnosis, its evaluation, treatment and usual course. All questions answered.      Taty Barker PA-C

## 2024-07-22 NOTE — PROCEDURES
"[facility] - Pulmonary Function  Six Minute Walk     SUMMARY     Ordering Provider: YOVANA Barker   Interpreting Provider: Dr. Styles  Performing nurse/tech/RT: SOPHIA Winston RRT  Diagnosis: COPD  Height: 5' 1" (154.9 cm)  Weight: 76 kg (167 lb 7 oz)  BMI (Calculated): 31.7   Patient Race:             Phase Oxygen Assessment Supplemental O2 Heart   Rate Blood Pressure Manda Dyspnea Scale Rating   Resting 92 % Room Air 99 bpm 160/65 3   Exercise        Minute        1 90 % Room Air 107 bpm     2 88 % Room Air 112 bpm     3 92 % 2 L/M 109 bpm     4 91 % 2 L/M 114 bpm     5 91 % 2 L/M 117 bpm     6  94 % 2 L/M 107 bpm (!) 210/78 5-6   Recovery        Minute        1 94 % 2 L/M 107 bpm     2 94 % 2 L/M 106 bpm     3 95 % 2 L/M 105 bpm     4 96 % 2 L/M 101 bpm 160/60 3     Six Minute Walk Summary  6MWT Status: completed without stopping  Patient Reported:  (leg weakness)     Interpretation:  Did the patient stop or pause?: No                                         Total Time Walked (Calculated): 360 seconds  Final Partial Lap Distance (feet): 100 feet  Total Distance Meters (Calculated): 335.28 meters  Predicted Distance Meters (Calculated): 426.87 meters  Percentage of Predicted (Calculated): 78.54  Peak VO2 (Calculated): 14.04  Mets: 4.01  Has The Patient Had a Previous Six Minute Walk Test?: Yes       Previous 6MWT Results  Has The Patient Had a Previous Six Minute Walk Test?: Yes  Date of Previous Test: 04/06/23  Total Time Walked: 360 seconds  Total Distance (meters): 426.72  Predicted Distance (meters): 418.33 meters  Percentage of Predicted: 102.01  Percent Change (Calculated): 0.21    "

## 2024-07-23 LAB
BRPFT: ABNORMAL
FEF 25 75 LLN: 1.15
FEF 25 75 PRE REF: 11.4 %
FEF 25 75 REF: 2.55
FEV1 FVC LLN: 66
FEV1 FVC PRE REF: 62.6 %
FEV1 FVC REF: 79
FEV1 LLN: 1.49
FEV1 PRE REF: 37.5 %
FEV1 REF: 2.03
FVC LLN: 1.91
FVC PRE REF: 59.6 %
FVC REF: 2.6
PEF LLN: 3.79
PEF PRE REF: 38.3 %
PEF REF: 5.35
PRE FEF 25 75: 0.29 L/S (ref 1.15–3.94)
PRE FET 100: 11.44 SEC
PRE FEV1 FVC: 49.29 % (ref 65.55–91.86)
PRE FEV1: 0.76 L (ref 1.49–2.58)
PRE FVC: 1.55 L (ref 1.91–3.29)
PRE PEF: 2.05 L/S (ref 3.79–6.9)

## 2024-08-22 ENCOUNTER — OFFICE VISIT (OUTPATIENT)
Dept: DIABETES | Facility: CLINIC | Age: 69
End: 2024-08-22
Payer: MEDICARE

## 2024-08-22 VITALS
HEART RATE: 91 BPM | SYSTOLIC BLOOD PRESSURE: 102 MMHG | HEIGHT: 61 IN | WEIGHT: 170.63 LBS | BODY MASS INDEX: 32.22 KG/M2 | DIASTOLIC BLOOD PRESSURE: 68 MMHG

## 2024-08-22 DIAGNOSIS — E11.69 HYPERLIPIDEMIA ASSOCIATED WITH TYPE 2 DIABETES MELLITUS: ICD-10-CM

## 2024-08-22 DIAGNOSIS — E11.22 CONTROLLED TYPE 2 DIABETES MELLITUS WITH STAGE 3 CHRONIC KIDNEY DISEASE, WITH LONG-TERM CURRENT USE OF INSULIN: Primary | ICD-10-CM

## 2024-08-22 DIAGNOSIS — N18.30 CONTROLLED TYPE 2 DIABETES MELLITUS WITH STAGE 3 CHRONIC KIDNEY DISEASE, WITH LONG-TERM CURRENT USE OF INSULIN: Primary | ICD-10-CM

## 2024-08-22 DIAGNOSIS — I10 ESSENTIAL HYPERTENSION: ICD-10-CM

## 2024-08-22 DIAGNOSIS — E78.5 HYPERLIPIDEMIA ASSOCIATED WITH TYPE 2 DIABETES MELLITUS: ICD-10-CM

## 2024-08-22 DIAGNOSIS — I73.9 PVD (PERIPHERAL VASCULAR DISEASE): ICD-10-CM

## 2024-08-22 DIAGNOSIS — E03.9 HYPOTHYROIDISM, UNSPECIFIED TYPE: ICD-10-CM

## 2024-08-22 DIAGNOSIS — Z79.4 CONTROLLED TYPE 2 DIABETES MELLITUS WITH STAGE 3 CHRONIC KIDNEY DISEASE, WITH LONG-TERM CURRENT USE OF INSULIN: Primary | ICD-10-CM

## 2024-08-22 DIAGNOSIS — E55.9 VITAMIN D DEFICIENCY: ICD-10-CM

## 2024-08-22 LAB — GLUCOSE SERPL-MCNC: 62 MG/DL (ref 70–110)

## 2024-08-22 PROCEDURE — 99999 PR PBB SHADOW E&M-EST. PATIENT-LVL V: CPT | Mod: PBBFAC,,, | Performed by: NURSE PRACTITIONER

## 2024-08-22 PROCEDURE — 82962 GLUCOSE BLOOD TEST: CPT | Mod: S$GLB,,, | Performed by: NURSE PRACTITIONER

## 2024-08-22 RX ORDER — INSULIN DEGLUDEC 200 U/ML
INJECTION, SOLUTION SUBCUTANEOUS
Qty: 3 PEN | Refills: 5 | Status: SHIPPED | OUTPATIENT
Start: 2024-08-22

## 2024-08-22 NOTE — PROGRESS NOTES
Subjective:         Patient ID: Bebe Sandhu is a 68 y.o. female.  Patient's current PCP is Haroldo Fernandes MD.     Chief Complaint: Diabetes Mellitus    HPI  Bebe Sandhu is a 68 y.o. Black or  female presenting for a follow up for diabetes. Patient has been diagnosed with type 2 diabetes since 10/2017 .    CURRENT DM MEDICATIONS:   Diabetes Medications               insulin degludec (TRESIBA FLEXTOUCH U-200) 200 unit/mL (3 mL) insulin pen INJECT 14 UNITS SUBCUTANEOUSLY DAILY (BULK)    JARDIANCE 25 mg tablet Take 1 tablet (25 mg total) by mouth once daily.    metFORMIN (GLUCOPHAGE) 1000 MG tablet Take 1 tablet (1,000 mg total) by mouth 2 (two) times daily.     Past failed treatment include: Januvia d'c'd due to elevated lipase    Blood glucose testing Continuous per Sigrid 3  Preferred lab: [facility]    Any episodes of hypoglycemia? 0% per Sigrid, however glucose of 62 mg/dL during OV.    Complications related to diabetes: nephropathy, retinopathy and peripheral vascular disease    Her blood sugar in the clinic today was:   Lab Results   Component Value Date    POCGLU 62 (A) 08/22/2024     Bebe Sandhu presents today for follow up visit to discuss diabetes management.      Reviewed PCP labs in Care Everywhere. A1c remains at goal. Tolerating Jardiance well without  side effects.     Eye and foot exams are UTD.    Evaluated in the ER for dehydration 05/20/2024- did not require admission.    Per Sigrid download, for the last 14 days: Average glucose of 118 mg/dL. 94% in range. 6% of readings are high, with no readings > 250. Glucose variability of 26.5% with estimated GMI 6.1%. Intermittent,mild hypoglycemia - overnight, and scattered daytime per daily review. Based on review, Tresiba reduced today.      CKD III- Sees Dr. Schroeder.     GI- Dr. Louis- History of elevated lipase-denies history of pancreatitis. Most recent liver enzymes normal. Pt states she was told she  "likely had hepatitis A as a child.     Familial hyperlipidemia- Taking Zetia, Vascepa, and Crestor daily. Also sees cardiologist, Dr. Flores.     Sees Dr. Ragland - Followed for abdominal aneurysm.     Hypothyroidism- She takes Levothyroxine 50 mcg daily.     Vitamin D level -taking Vitamin D 5,000 International Units daily.    Current diet: Diabetic  Activity Level: no structured exercise      Lab Results   Component Value Date    HGBA1C 5.3 06/02/2020     STANDARDS OF CARE  Diabetes Management Status    Statin: Taking  ACE/ARB: Taking    Screening or Prevention Patient's value Goal Complete/Controlled?   HgA1C Testing and Control   Lab Results   Component Value Date    HGBA1C 5.3 06/02/2020      Annually/Less than 8% No   Lipid profile : 08/19/2024 Annually No   LDL control Lab Results   Component Value Date    LDLCALC 81 06/02/2020    Annually/Less than 100 mg/dl  No   Nephropathy screening No results found for: "LABMICR"  Lab Results   Component Value Date    PROTEINUA Negative 09/23/2021     Lab Results   Component Value Date    UTPCR Unable to calculate 10/12/2020      Annually No   Blood pressure BP Readings from Last 1 Encounters:   08/22/24 102/68    Less than 140/90 Yes   Dilated retinal exam : 04/19/2023 Annually Yes   Foot exam   : 02/22/2024 Annually Yes      Labs reviewed and are noted below.     Lab Results   Component Value Date    WBC 10.59 09/23/2021    HGB 14.5 09/23/2021    HCT 45.5 09/23/2021     09/23/2021    CHOL 165 06/02/2020    TRIG 234 (H) 06/02/2020    HDL 37 (L) 06/02/2020    LDLCALC 81 06/02/2020     09/23/2021    K 4.1 09/23/2021     09/23/2021    ANIONGAP 14 09/23/2021    CREATININE 1.00 10/25/2021    ESTGFRAFRICA 67 10/25/2021    EGFRNONAA 59.3 (A) 09/23/2021    BUN 18 10/25/2021    CO2 28 09/23/2021    TSH 1.182 06/02/2020    GLU 86 09/23/2021    UTPCR Unable to calculate 10/12/2020     Lab Results   Component Value Date    TSH 1.182 06/02/2020    PTH 41.0 08/13/2019 " "   CALCIUM 9.5 09/23/2021    PHOS 3.1 09/23/2021     No results found for: "CPEPTIDE"  No results found for: "GLUTAMICACID"  Glucose   Date Value Ref Range Status   09/23/2021 86 70 - 110 mg/dL Final     Anion Gap   Date Value Ref Range Status   09/23/2021 14 8 - 16 mmol/L Final     eGFR if    Date Value Ref Range Status   09/23/2021 >60.0 >60 mL/min/1.73 m^2 Final     eGFR    Date Value Ref Range Status   10/25/2021 67 >=60 mL/min/1.73mSq Final     eGFR if non    Date Value Ref Range Status   09/23/2021 59.3 (A) >60 mL/min/1.73 m^2 Final     Comment:     Calculation used to obtain the estimated glomerular filtration  rate (eGFR) is the CKD-EPI equation.          The following portions of the patient's history were reviewed and updated as appropriate: allergies, current medications, past family history, past medical history, past social history, past surgical history and problem list.    Review of patient's allergies indicates:   Allergen Reactions    Sulfa (sulfonamide antibiotics) Rash     Social History     Socioeconomic History    Marital status:    Tobacco Use    Smoking status: Every Day     Current packs/day: 1.00     Types: Cigarettes    Smokeless tobacco: Never   Substance and Sexual Activity    Alcohol use: No    Drug use: No    Sexual activity: Not Currently     Social Determinants of Health     Financial Resource Strain: Medium Risk (11/14/2023)    Received from [facility], [facility]    Overall Financial Resource Strain (CARDIA)     Difficulty of Paying Living Expenses: Somewhat hard   Food Insecurity: No Food Insecurity (11/14/2023)    Received from [facility] and Aff[facility]ates, [facility]    Hunger Vital Sign     Worried About Running Out of Food in the Last Year: Never true     Ran Out of Food in the Last Year: Never true   Transportation Needs: No Transportation Needs (11/14/2023)    Received from [facility] and Its [facility], [facility] Its [facility]    PRAPARE - Transportation     Lack of Transportation (Medical): No     Lack of Transportation (Non-Medical): No   Physical Activity: Insufficiently Active (11/14/2023)    Received from [facility] and Its [facility], [facility] and Its [facility]    Exercise Vital Sign     Days of Exercise per Week: 3 days     Minutes of Exercise per Session: 20 min   Housing Stability: Unknown (11/14/2023)    Received from [facility] and Its [facility], [facility] and [facility]    Housing Stability Vital Sign     Number of Places Lived in the Last Year: 1     Past Medical History:   Diagnosis Date    Anemia     Anxiety     Anxiety associated with depression     High cholesterol     Hypertension     Mental disorder      REVIEW OF SYSTEMS:  Eyes History of DR.  Cardiovascular: History of HTN and HLD.  GI: History of elevated lipase in the past.  : History of nephropathy.  Neuro: No neuropathy.  PSYCH: Current tobacco use.   ENDO: See HPI.        Objective:      Vitals:    08/22/24 1036   BP: 102/68   Pulse: 91     RESPIRATORY: No respiratory distress  NEUROLOGIC: Cranial nerves II-XII grossly intact.   PSYCHIATRIC: Alert & oriented x3. Normal mood and affect.  FOOT EXAMINATION: UTD    Assessment:       1. Controlled type 2 diabetes mellitus with stage 3 chronic kidney disease, with long-term  "current use of insulin    2. Hyperlipidemia associated with type 2 diabetes mellitus    3. Essential hypertension    4. Hypothyroidism, unspecified type    5. PVD (peripheral vascular disease)    6. Vitamin D deficiency        Plan:   Bebe was seen today for diabetes mellitus.    Diagnoses and all orders for this visit:    Controlled type 2 diabetes mellitus with stage 3 chronic kidney disease, with long-term current use of insulin  -     POCT Glucose, Hand-Held Device  -     insulin degludec (TRESIBA FLEXTOUCH U-200) 200 unit/mL (3 mL) insulin pen; INJECT 12 UNITS SUBCUTANEOUSLY DAILY (BULK)    Chronic -     Decrease Tresiba 12 units once a day.     Continue Metformin     Continue Jardiance to 25 mg daily.    Continuous glucose monitoring report:    The patient's CGM was downloaded and was reviewed for the last 14 days. See HPI for interpretation & plan.    Hyperlipidemia associated with type 2 diabetes mellitus    Essential hypertension    Hypothyroidism, unspecified type    PVD (peripheral vascular disease)    Vitamin D deficiency        - Follow up:  6 months    Portions of this note may have been created with voice recognition software. Occasional "wrong-word" or "sound-a-like" substitutions may have occurred due to the inherent limitations of voice recognition software. Please, read the note carefully and recognize, using context, where substitutions have occurred.           Viry Chou,TERRENCEC, BC-ADM  [facility]  "

## 2024-08-22 NOTE — PATIENT INSTRUCTIONS
1. Limit carbs to no more than 45 grams with each meal. Never eat carbs by themselves, always add protein. Make snacks low carb or non-carb only; Stick to snack sheet provided    2. Continue checking blood sugar 3 times daily: Always check before meals and occasionally 2 hours after any meal or bedtime. Blood sugar goals should be a fasting blood sugar between , and no blood sugars throughout the day over 180 is ok, less than 160 better, less than 140 perfect. Keep a log book and bring with you to all appointments along with your glucose meter.    3. Medications adjusted for today's visit include:    Decrease Tresiba 12 units once a day.     Continue Metformin     Continue Jardiance to 25 mg daily.    4. Carry glucose tablets with you at all times to treat a low blood sugar episode (less than 70). Chew 4 tablets and re-check blood sugar in 15 minutes. If still low, repeat this. Always call the clinic to give an update for any low blood sugar episodes.    5. Continue efforts to increase exercise as tolerated.    6. Follow-up for your next visit in 6 months or sooner if needed.    7. Please either drop off, fax, or MyChart your readings to me as needed    Drop off or fax: Brayden Chou

## 2024-09-30 DIAGNOSIS — N18.30 CONTROLLED TYPE 2 DIABETES MELLITUS WITH STAGE 3 CHRONIC KIDNEY DISEASE, WITH LONG-TERM CURRENT USE OF INSULIN: ICD-10-CM

## 2024-09-30 DIAGNOSIS — E11.22 CONTROLLED TYPE 2 DIABETES MELLITUS WITH STAGE 3 CHRONIC KIDNEY DISEASE, WITH LONG-TERM CURRENT USE OF INSULIN: ICD-10-CM

## 2024-09-30 DIAGNOSIS — Z79.4 CONTROLLED TYPE 2 DIABETES MELLITUS WITH STAGE 3 CHRONIC KIDNEY DISEASE, WITH LONG-TERM CURRENT USE OF INSULIN: ICD-10-CM

## 2024-09-30 RX ORDER — BLOOD-GLUCOSE SENSOR
EACH MISCELLANEOUS
Qty: 2 EACH | Refills: 11 | Status: SHIPPED | OUTPATIENT
Start: 2024-09-30

## 2024-10-07 ENCOUNTER — TELEPHONE (OUTPATIENT)
Dept: DIABETES | Facility: CLINIC | Age: 69
End: 2024-10-07
Payer: MEDICARE

## 2024-10-07 DIAGNOSIS — Z79.4 CONTROLLED TYPE 2 DIABETES MELLITUS WITH STAGE 3 CHRONIC KIDNEY DISEASE, WITH LONG-TERM CURRENT USE OF INSULIN: Primary | ICD-10-CM

## 2024-10-07 DIAGNOSIS — E11.22 CONTROLLED TYPE 2 DIABETES MELLITUS WITH STAGE 3 CHRONIC KIDNEY DISEASE, WITH LONG-TERM CURRENT USE OF INSULIN: Primary | ICD-10-CM

## 2024-10-07 DIAGNOSIS — N18.30 CONTROLLED TYPE 2 DIABETES MELLITUS WITH STAGE 3 CHRONIC KIDNEY DISEASE, WITH LONG-TERM CURRENT USE OF INSULIN: Primary | ICD-10-CM

## 2024-10-07 RX ORDER — BLOOD-GLUCOSE SENSOR
EACH MISCELLANEOUS
Qty: 2 EACH | Refills: 11 | Status: SHIPPED | OUTPATIENT
Start: 2024-10-07

## 2024-10-07 NOTE — TELEPHONE ENCOUNTER
Sending Sigrid 3 Plus due to availability. Let her know I sent to [facility] in [place], but if she needs the RX to go somewhere different, let me know.
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
## 2024-10-07 NOTE — TELEPHONE ENCOUNTER
Pt states that [facility] that [facility] can't sigrid 3 any more pt wants to know if you can send over a RX script for Sigrid 3 plus

## 2024-11-04 ENCOUNTER — LAB VISIT (OUTPATIENT)
Dept: LAB | Facility: HOSPITAL | Age: 69
End: 2024-11-04
Attending: HOSPITALIST
Payer: MEDICARE

## 2024-11-04 ENCOUNTER — OFFICE VISIT (OUTPATIENT)
Dept: PULMONOLOGY | Facility: CLINIC | Age: 69
End: 2024-11-04
Payer: MEDICARE

## 2024-11-04 VITALS
OXYGEN SATURATION: 92 % | BODY MASS INDEX: 32.51 KG/M2 | HEIGHT: 61 IN | SYSTOLIC BLOOD PRESSURE: 106 MMHG | HEART RATE: 90 BPM | WEIGHT: 172.19 LBS | DIASTOLIC BLOOD PRESSURE: 62 MMHG | RESPIRATION RATE: 17 BRPM

## 2024-11-04 DIAGNOSIS — J41.0 SIMPLE CHRONIC BRONCHITIS: ICD-10-CM

## 2024-11-04 DIAGNOSIS — J44.9 CHRONIC OBSTRUCTIVE PULMONARY DISEASE, UNSPECIFIED COPD TYPE: Primary | ICD-10-CM

## 2024-11-04 DIAGNOSIS — J45.20 MILD INTERMITTENT ASTHMA WITHOUT COMPLICATION: ICD-10-CM

## 2024-11-04 DIAGNOSIS — Z72.0 TOBACCO USE: ICD-10-CM

## 2024-11-04 DIAGNOSIS — F17.210 NICOTINE DEPENDENCE, CIGARETTES, UNCOMPLICATED: ICD-10-CM

## 2024-11-04 DIAGNOSIS — G47.34 NOCTURNAL HYPOXEMIA: ICD-10-CM

## 2024-11-04 LAB
BASOPHILS # BLD AUTO: 0.07 K/UL (ref 0–0.2)
BASOPHILS NFR BLD: 0.7 % (ref 0–1.9)
DIFFERENTIAL METHOD BLD: ABNORMAL
EOSINOPHIL # BLD AUTO: 0.2 K/UL (ref 0–0.5)
EOSINOPHIL NFR BLD: 2.3 % (ref 0–8)
ERYTHROCYTE [DISTWIDTH] IN BLOOD BY AUTOMATED COUNT: 13.5 % (ref 11.5–14.5)
HCT VFR BLD AUTO: 35.1 % (ref 37–48.5)
HGB BLD-MCNC: 11.3 G/DL (ref 12–16)
IMM GRANULOCYTES # BLD AUTO: 0.03 K/UL (ref 0–0.04)
IMM GRANULOCYTES NFR BLD AUTO: 0.3 % (ref 0–0.5)
LYMPHOCYTES # BLD AUTO: 3 K/UL (ref 1–4.8)
LYMPHOCYTES NFR BLD: 29.5 % (ref 18–48)
MCH RBC QN AUTO: 29.9 PG (ref 27–31)
MCHC RBC AUTO-ENTMCNC: 32.2 G/DL (ref 32–36)
MCV RBC AUTO: 93 FL (ref 82–98)
MONOCYTES # BLD AUTO: 0.6 K/UL (ref 0.3–1)
MONOCYTES NFR BLD: 5.4 % (ref 4–15)
NEUTROPHILS # BLD AUTO: 6.3 K/UL (ref 1.8–7.7)
NEUTROPHILS NFR BLD: 61.8 % (ref 38–73)
NRBC BLD-RTO: 0 /100 WBC
PLATELET # BLD AUTO: 359 K/UL (ref 150–450)
PMV BLD AUTO: 10.1 FL (ref 9.2–12.9)
RBC # BLD AUTO: 3.78 M/UL (ref 4–5.4)
WBC # BLD AUTO: 10.21 K/UL (ref 3.9–12.7)

## 2024-11-04 PROCEDURE — 1126F AMNT PAIN NOTED NONE PRSNT: CPT | Mod: CPTII,S$GLB,, | Performed by: HOSPITALIST

## 2024-11-04 PROCEDURE — 1101F PT FALLS ASSESS-DOCD LE1/YR: CPT | Mod: CPTII,S$GLB,, | Performed by: HOSPITALIST

## 2024-11-04 PROCEDURE — 3288F FALL RISK ASSESSMENT DOCD: CPT | Mod: CPTII,S$GLB,, | Performed by: HOSPITALIST

## 2024-11-04 PROCEDURE — 1159F MED LIST DOCD IN RCRD: CPT | Mod: CPTII,S$GLB,, | Performed by: HOSPITALIST

## 2024-11-04 PROCEDURE — 85025 COMPLETE CBC W/AUTO DIFF WBC: CPT | Performed by: HOSPITALIST

## 2024-11-04 PROCEDURE — 3008F BODY MASS INDEX DOCD: CPT | Mod: CPTII,S$GLB,, | Performed by: HOSPITALIST

## 2024-11-04 PROCEDURE — 3074F SYST BP LT 130 MM HG: CPT | Mod: CPTII,S$GLB,, | Performed by: HOSPITALIST

## 2024-11-04 PROCEDURE — 3078F DIAST BP <80 MM HG: CPT | Mod: CPTII,S$GLB,, | Performed by: HOSPITALIST

## 2024-11-04 PROCEDURE — 1160F RVW MEDS BY RX/DR IN RCRD: CPT | Mod: CPTII,S$GLB,, | Performed by: HOSPITALIST

## 2024-11-04 PROCEDURE — 99999 PR PBB SHADOW E&M-EST. PATIENT-LVL V: CPT | Mod: PBBFAC,,, | Performed by: HOSPITALIST

## 2024-11-04 PROCEDURE — 36415 COLL VENOUS BLD VENIPUNCTURE: CPT | Performed by: HOSPITALIST

## 2024-11-04 PROCEDURE — 99214 OFFICE O/P EST MOD 30 MIN: CPT | Mod: S$GLB,,, | Performed by: HOSPITALIST

## 2024-11-04 PROCEDURE — 4010F ACE/ARB THERAPY RXD/TAKEN: CPT | Mod: CPTII,S$GLB,, | Performed by: HOSPITALIST

## 2024-11-04 PROCEDURE — 82785 ASSAY OF IGE: CPT | Performed by: HOSPITALIST

## 2024-11-04 RX ORDER — IPRATROPIUM BROMIDE AND ALBUTEROL SULFATE 2.5; .5 MG/3ML; MG/3ML
3 SOLUTION RESPIRATORY (INHALATION) EVERY 6 HOURS PRN
Qty: 75 ML | Refills: 5 | Status: SHIPPED | OUTPATIENT
Start: 2024-11-04
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
## 2024-11-04 NOTE — ASSESSMENT & PLAN NOTE
- possible asthma component  - testing with ige, cbc today, feno on follow up  - continue anoro and albuterol as needed  - discussed doing nebulizer treatment in the mornings followed by Anoro

## 2024-11-04 NOTE — ASSESSMENT & PLAN NOTE
- current, half pack per day, trying to cut down  - >30 pack years  - due for LDCT, to be scheduled this month

## 2024-11-04 NOTE — ASSESSMENT & PLAN NOTE
- wearing oxygen at night  - pt denies snoring  - feels that she wakes up feeling well rested  - no prior sleep apnea testing  - consider overnight ox at follow up

## 2024-11-05 LAB — IGE SERPL-ACNC: <35 IU/ML (ref 0–100)

## 2024-12-03 DIAGNOSIS — N18.30 CONTROLLED TYPE 2 DIABETES MELLITUS WITH STAGE 3 CHRONIC KIDNEY DISEASE, WITH LONG-TERM CURRENT USE OF INSULIN: ICD-10-CM

## 2024-12-03 DIAGNOSIS — E11.22 CONTROLLED TYPE 2 DIABETES MELLITUS WITH STAGE 3 CHRONIC KIDNEY DISEASE, WITH LONG-TERM CURRENT USE OF INSULIN: ICD-10-CM

## 2024-12-03 DIAGNOSIS — Z79.4 CONTROLLED TYPE 2 DIABETES MELLITUS WITH STAGE 3 CHRONIC KIDNEY DISEASE, WITH LONG-TERM CURRENT USE OF INSULIN: ICD-10-CM

## 2024-12-03 RX ORDER — METFORMIN HYDROCHLORIDE 1000 MG/1
1000 TABLET ORAL 2 TIMES DAILY
Qty: 180 TABLET | Refills: 3 | Status: SHIPPED | OUTPATIENT
Start: 2024-12-03

## 2024-12-04 ENCOUNTER — HOSPITAL ENCOUNTER (OUTPATIENT)
Dept: RADIOLOGY | Facility: HOSPITAL | Age: 69
Discharge: HOME | End: 2024-12-04
Attending: HOSPITALIST
Payer: MEDICARE

## 2024-12-04 DIAGNOSIS — F17.210 NICOTINE DEPENDENCE, CIGARETTES, UNCOMPLICATED: ICD-10-CM

## 2024-12-04 PROCEDURE — 71271 CT THORAX LUNG CANCER SCR C-: CPT | Mod: 26,,, | Performed by: RADIOLOGY

## 2024-12-04 PROCEDURE — 71271 CT THORAX LUNG CANCER SCR C-: CPT | Mod: TC

## 2025-01-10 DIAGNOSIS — N18.30 CONTROLLED TYPE 2 DIABETES MELLITUS WITH STAGE 3 CHRONIC KIDNEY DISEASE, WITH LONG-TERM CURRENT USE OF INSULIN: ICD-10-CM

## 2025-01-10 DIAGNOSIS — Z79.4 CONTROLLED TYPE 2 DIABETES MELLITUS WITH STAGE 3 CHRONIC KIDNEY DISEASE, WITH LONG-TERM CURRENT USE OF INSULIN: ICD-10-CM

## 2025-01-10 DIAGNOSIS — E11.22 CONTROLLED TYPE 2 DIABETES MELLITUS WITH STAGE 3 CHRONIC KIDNEY DISEASE, WITH LONG-TERM CURRENT USE OF INSULIN: ICD-10-CM

## 2025-01-10 RX ORDER — PEN NEEDLE, DIABETIC 30 GX3/16"
NEEDLE, DISPOSABLE MISCELLANEOUS
Qty: 100 EACH | Refills: 0 | Status: SHIPPED | OUTPATIENT
Start: 2025-01-10

## 2025-01-29 DIAGNOSIS — E11.22 CONTROLLED TYPE 2 DIABETES MELLITUS WITH STAGE 3 CHRONIC KIDNEY DISEASE, WITH LONG-TERM CURRENT USE OF INSULIN: ICD-10-CM

## 2025-01-29 DIAGNOSIS — Z79.4 CONTROLLED TYPE 2 DIABETES MELLITUS WITH STAGE 3 CHRONIC KIDNEY DISEASE, WITH LONG-TERM CURRENT USE OF INSULIN: ICD-10-CM

## 2025-01-29 DIAGNOSIS — N18.30 CONTROLLED TYPE 2 DIABETES MELLITUS WITH STAGE 3 CHRONIC KIDNEY DISEASE, WITH LONG-TERM CURRENT USE OF INSULIN: ICD-10-CM

## 2025-01-29 RX ORDER — EMPAGLIFLOZIN 25 MG/1
25 TABLET, FILM COATED ORAL DAILY
Qty: 90 TABLET | Refills: 3 | Status: SHIPPED | OUTPATIENT
Start: 2025-01-29

## 2025-02-20 ENCOUNTER — OFFICE VISIT (OUTPATIENT)
Dept: DIABETES | Facility: CLINIC | Age: 70
End: 2025-02-20
Payer: MEDICARE

## 2025-02-20 VITALS
HEART RATE: 89 BPM | DIASTOLIC BLOOD PRESSURE: 73 MMHG | SYSTOLIC BLOOD PRESSURE: 114 MMHG | HEIGHT: 61 IN | BODY MASS INDEX: 32.97 KG/M2 | WEIGHT: 174.63 LBS

## 2025-02-20 DIAGNOSIS — E03.9 HYPOTHYROIDISM, UNSPECIFIED TYPE: ICD-10-CM

## 2025-02-20 DIAGNOSIS — E11.22 CONTROLLED TYPE 2 DIABETES MELLITUS WITH STAGE 3 CHRONIC KIDNEY DISEASE, WITH LONG-TERM CURRENT USE OF INSULIN: Primary | ICD-10-CM

## 2025-02-20 DIAGNOSIS — N18.30 CONTROLLED TYPE 2 DIABETES MELLITUS WITH STAGE 3 CHRONIC KIDNEY DISEASE, WITH LONG-TERM CURRENT USE OF INSULIN: Primary | ICD-10-CM

## 2025-02-20 DIAGNOSIS — E78.5 HYPERLIPIDEMIA ASSOCIATED WITH TYPE 2 DIABETES MELLITUS: ICD-10-CM

## 2025-02-20 DIAGNOSIS — Z79.4 CONTROLLED TYPE 2 DIABETES MELLITUS WITH STAGE 3 CHRONIC KIDNEY DISEASE, WITH LONG-TERM CURRENT USE OF INSULIN: Primary | ICD-10-CM

## 2025-02-20 DIAGNOSIS — E11.69 HYPERLIPIDEMIA ASSOCIATED WITH TYPE 2 DIABETES MELLITUS: ICD-10-CM

## 2025-02-20 DIAGNOSIS — I73.9 PVD (PERIPHERAL VASCULAR DISEASE): ICD-10-CM

## 2025-02-20 DIAGNOSIS — I10 ESSENTIAL HYPERTENSION: ICD-10-CM

## 2025-02-20 DIAGNOSIS — E55.9 VITAMIN D DEFICIENCY: ICD-10-CM

## 2025-02-20 LAB — GLUCOSE SERPL-MCNC: 94 MG/DL (ref 70–110)

## 2025-02-20 RX ORDER — INSULIN DEGLUDEC 200 U/ML
INJECTION, SOLUTION SUBCUTANEOUS
Qty: 3 PEN | Refills: 5 | Status: SHIPPED | OUTPATIENT
Start: 2025-02-20

## 2025-02-20 RX ORDER — PEN NEEDLE, DIABETIC 30 GX3/16"
NEEDLE, DISPOSABLE MISCELLANEOUS
Qty: 100 EACH | Refills: 3 | Status: SHIPPED | OUTPATIENT
Start: 2025-02-20

## 2025-02-20 NOTE — PROGRESS NOTES
Subjective:         Patient ID: Bebe Sandhu is a 69 y.o. female.  Patient's current PCP is Haroldo Fernandes MD.     Chief Complaint: Diabetes Mellitus    HPI  Bebe Sandhu is a 69 y.o. Black or  female presenting for a follow up for diabetes. Patient has been diagnosed with type 2 diabetes since 10/2017 .    CURRENT DM MEDICATIONS:   Diabetes Medications               insulin degludec (TRESIBA FLEXTOUCH U-200) 200 unit/mL (3 mL) insulin pen INJECT 14 UNITS SUBCUTANEOUSLY DAILY (BULK) 12 units    JARDIANCE 25 mg tablet Take 1 tablet (25 mg total) by mouth once daily.    metFORMIN (GLUCOPHAGE) 1000 MG tablet Take 1 tablet (1,000 mg total) by mouth 2 (two) times daily.     Past failed treatment include: Januvia d'c'd due to elevated lipase    Blood glucose testing Continuous per [facility] 3  Preferred lab: [facility]    Any episodes of hypoglycemia? 1% mild per [facility]    Complications related to diabetes: nephropathy, retinopathy and peripheral vascular disease    Her blood sugar in the clinic today was:   Lab Results   Component Value Date    POCGLU 94 02/20/2025     Bebe Sandhu presents today for follow up visit to discuss diabetes management.      Per Sig[facility] download, for the last 14 days:  Average glucose of 109 mg/dL and 97% in range. Overall there is a pattern of euglycemia, but some scattered hypoglycemia noted. Based on review, Tresiba will be reduced.            CKD III- Sees Dr. Schroeder.     GI- Dr. Louis- History of elevated lipase-denies history of pancreatitis. Most recent liver enzymes normal. Pt states she was told she likely had hepatitis A as a child.     Familial hyperlipidemia- Taking Zetia, Vascepa, and Crestor daily. Also sees cardiologist, Dr. Flores.     Sees Dr. Ragland - Followed for abdominal aneurysm.     Hypothyroidism- She takes Levothyroxine 50 mcg daily.     Vitamin D level -taking Vitamin D 5,000 International Units daily.    Current diet:  "Diabetic  Activity Level: no structured exercise        Lab Results   Component Value Date    HGBA1C 5.3 06/02/2020     STANDARDS OF CARE  Diabetes Management Status    Statin: Taking  ACE/ARB: Taking    Screening or Prevention Patient's value Goal Complete/Controlled?   HgA1C Testing and Control   Lab Results   Component Value Date    HGBA1C 5.3 06/02/2020      Annually/Less than 8% No   Lipid profile : 02/19/2025 Annually No   LDL control Lab Results   Component Value Date    LDLCALC 81 06/02/2020    Annually/Less than 100 mg/dl  No   Nephropathy screening No results found for: "LABMICR"  Lab Results   Component Value Date    PROTEINUA Negative 09/23/2021     Lab Results   Component Value Date    UTPCR Unable to calculate 10/12/2020      Annually No   Blood pressure BP Readings from Last 1 Encounters:   02/20/25 114/73    Less than 140/90 Yes   Dilated retinal exam : 04/19/2023 Annually No   Foot exam   : 02/22/2024 Annually Yes      Labs reviewed and are noted below.     Lab Results   Component Value Date    WBC 10.21 11/04/2024    HGB 11.3 (L) 11/04/2024    HCT 35.1 (L) 11/04/2024     11/04/2024    CHOL 165 06/02/2020    TRIG 234 (H) 06/02/2020    HDL 37 (L) 06/02/2020    LDLCALC 81 06/02/2020     09/23/2021    K 4.1 09/23/2021     09/23/2021    ANIONGAP 14 09/23/2021    CREATININE 1.00 10/25/2021    ESTGFRAFRICA 67 10/25/2021    EGFRNONAA 59.3 (A) 09/23/2021    BUN 18 10/25/2021    CO2 28 09/23/2021    TSH 1.182 06/02/2020    GLU 86 09/23/2021    UTPCR Unable to calculate 10/12/2020     Lab Results   Component Value Date    TSH 1.182 06/02/2020    PTH 41.0 08/13/2019    CALCIUM 9.5 09/23/2021    PHOS 3.1 09/23/2021     No results found for: "CPEPTIDE"  No results found for: "GLUTAMICACID"  Glucose   Date Value Ref Range Status   09/23/2021 86 70 - 110 mg/dL Final     Anion Gap   Date Value Ref Range Status   09/23/2021 14 8 - 16 mmol/L Final     eGFR if    Date Value Ref Range " Status   09/23/2021 >60.0 >60 mL/min/1.73 m^2 Final     eGFR    Date Value Ref Range Status   10/25/2021 67 >=60 mL/min/1.73mSq Final     eGFR if non    Date Value Ref Range Status   09/23/2021 59.3 (A) >60 mL/min/1.73 m^2 Final     Comment:     Calculation used to obtain the estimated glomerular filtration  rate (eGFR) is the CKD-EPI equation.          The following portions of the patient's history were reviewed and updated as appropriate: allergies, current medications, past family history, past medical history, past social history, past surgical history and problem list.    Review of patient's allergies indicates:   Allergen Reactions    Sulfa (sulfonamide antibiotics) Rash     Social History     Socioeconomic History    Marital status:    Tobacco Use    Smoking status: Every Day     Current packs/day: 1.00     Types: Cigarettes    Smokeless tobacco: Never   Substance and Sexual Activity    Alcohol use: No    Drug use: No    Sexual activity: Not Currently     Social Drivers of Health     Financial Resource Strain: Medium Risk (11/14/2023)    Received from [facility] and Its Subsidiaries and Affiliates    Overall Financial Resource Strain (CARDIA)     Difficulty of Paying Living Expenses: Somewhat hard   Food Insecurity: No Food Insecurity (11/14/2023)    Received from [facility] and Its Subsidiaries and Affiliates    Hunger Vital Sign     Worried About Running Out of Food in the Last Year: Never true     Ran Out of Food in the Last Year: Never true   Transportation Needs: No Transportation Needs (11/14/2023)    Received from [facility] and Its Subsidiaries and Affiliates    PRAPARE - Transportation     Lack of Transportation (Medical): No     Lack of Transportation (Non-Medical): No   Physical Activity: Insufficiently Active (11/14/2023)    [facility]    Exercise Vital Sign     Days of Exercise per Week: 3 days     Minutes of Exercise per Session: 20 min   Housing Stability: Unknown (11/14/2023)    [facility]    Housing Stability Vital Sign     Number of Places Lived in the Last Year: 1     Past Medical History:   Diagnosis Date    Anemia     Anxiety     Anxiety associated with depression     High cholesterol     Hypertension     Mental disorder      REVIEW OF SYSTEMS:  Eyes History of DR.  Cardiovascular: History of HTN and HLD.  GI: History of elevated lipase in the past.  : History of nephropathy.  Neuro: No neuropathy.  PSYCH: Current tobacco use.   ENDO: See HPI.        Objective:      Vitals:    02/20/25 1108   BP: 114/73   Pulse: 89     RESPIRATORY: No respiratory distress  NEUROLOGIC: Cranial nerves II-XII grossly intact.   PSYCHIATRIC: Alert & oriented x3. Normal mood and affect.  FOOT EXAMINATION: UTD    Assessment:       1. Controlled type 2 diabetes mellitus with stage 3 chronic kidney disease, with long-term current use of insulin    2. Hyperlipidemia associated with type 2 diabetes mellitus    3. Essential hypertension    4. Hypothyroidism, unspecified type    5. PVD (peripheral vascular disease)    6. Vitamin D deficiency        Plan:   Bebe was seen today for diabetes mellitus.    Diagnoses and all orders for this visit:    Controlled type 2 diabetes mellitus with stage 3 chronic kidney disease, with long-term current use of insulin  -     POCT Glucose, Hand-Held Device  -     pen needle, diabetic (BD ULTRA-FINE IAN PEN NEEDLE) 32 gauge x 5/32" Ndle; use DAILY with tresiba  -     insulin degludec (TRESIBA FLEXTOUCH U-200) 200 unit/mL (3 mL) insulin pen; INJECT 12 UNITS SUBCUTANEOUSLY DAILY (BULK)    Decrease Tresiba 10 units once a day.     Continue Metformin     Continue " "Jardiance to 25 mg daily.      Hyperlipidemia associated with type 2 diabetes mellitus    Essential hypertension    Hypothyroidism, unspecified type    PVD (peripheral vascular disease)    Vitamin D deficiency        - Follow up:  6 months    Portions of this note may have been created with voice recognition software. Occasional "wrong-word" or "sound-a-like" substitutions may have occurred due to the inherent limitations of voice recognition software. Please, read the note carefully and recognize, using context, where substitutions have occurred.           Viry Chou,RAMYA-C, BC-ADM  [facility]    "

## 2025-02-20 NOTE — PATIENT INSTRUCTIONS
1. Limit carbs to no more than 45 grams with each meal. Never eat carbs by themselves, always add protein. Make snacks low carb or non-carb only; Stick to snack sheet provided    2. Continue checking blood sugar 3 times daily: Always check before meals and occasionally 2 hours after any meal or bedtime. Blood sugar goals should be a fasting blood sugar between , and no blood sugars throughout the day over 180 is ok, less than 160 better, less than 140 perfect. Keep a log book and bring with you to all appointments along with your glucose meter.    3. Medications adjusted for today's visit include:    Decrease Tresiba 10 units once a day.     Continue Metformin     Continue Jardiance to 25 mg daily.    4. Carry glucose tablets with you at all times to treat a low blood sugar episode (less than 70). Chew 4 tablets and re-check blood sugar in 15 minutes. If still low, repeat this. Always call the clinic to give an update for any low blood sugar episodes.    5. Continue efforts to increase exercise as tolerated.    6. Follow-up for your next visit in 6 months or sooner if needed.    7. Please either drop off, fax, or MyChart your readings to me as needed    Drop off or fax: Brayden Chou

## 2025-03-17 ENCOUNTER — OFFICE VISIT (OUTPATIENT)
Dept: PULMONOLOGY | Facility: CLINIC | Age: 70
End: 2025-03-17
Attending: HOSPITALIST
Payer: MEDICARE

## 2025-03-17 VITALS
OXYGEN SATURATION: 97 % | BODY MASS INDEX: 32.97 KG/M2 | DIASTOLIC BLOOD PRESSURE: 60 MMHG | HEIGHT: 61 IN | RESPIRATION RATE: 16 BRPM | HEART RATE: 90 BPM | WEIGHT: 174.63 LBS | SYSTOLIC BLOOD PRESSURE: 120 MMHG

## 2025-03-17 DIAGNOSIS — G47.30 SLEEP-DISORDERED BREATHING: ICD-10-CM

## 2025-03-17 DIAGNOSIS — J45.20 MILD INTERMITTENT ASTHMA WITHOUT COMPLICATION: ICD-10-CM

## 2025-03-17 DIAGNOSIS — G47.34 NOCTURNAL HYPOXEMIA: ICD-10-CM

## 2025-03-17 DIAGNOSIS — Z72.0 TOBACCO USE: ICD-10-CM

## 2025-03-17 DIAGNOSIS — J41.0 SIMPLE CHRONIC BRONCHITIS: ICD-10-CM

## 2025-03-17 DIAGNOSIS — E66.01 SEVERE OBESITY (BMI 35.0-35.9 WITH COMORBIDITY): ICD-10-CM

## 2025-03-17 DIAGNOSIS — F17.210 NICOTINE DEPENDENCE, CIGARETTES, UNCOMPLICATED: Primary | ICD-10-CM

## 2025-03-17 PROCEDURE — 1126F AMNT PAIN NOTED NONE PRSNT: CPT | Mod: CPTII,S$GLB,, | Performed by: HOSPITALIST

## 2025-03-17 PROCEDURE — 3074F SYST BP LT 130 MM HG: CPT | Mod: CPTII,S$GLB,, | Performed by: HOSPITALIST

## 2025-03-17 PROCEDURE — 1101F PT FALLS ASSESS-DOCD LE1/YR: CPT | Mod: CPTII,S$GLB,, | Performed by: HOSPITALIST

## 2025-03-17 PROCEDURE — 3008F BODY MASS INDEX DOCD: CPT | Mod: CPTII,S$GLB,, | Performed by: HOSPITALIST

## 2025-03-17 PROCEDURE — 3078F DIAST BP <80 MM HG: CPT | Mod: CPTII,S$GLB,, | Performed by: HOSPITALIST

## 2025-03-17 PROCEDURE — 95012 NITRIC OXIDE EXP GAS DETER: CPT | Mod: S$GLB,,, | Performed by: INTERNAL MEDICINE

## 2025-03-17 PROCEDURE — 99999 PR PBB SHADOW E&M-EST. PATIENT-LVL V: CPT | Mod: PBBFAC,,, | Performed by: HOSPITALIST

## 2025-03-17 PROCEDURE — 1159F MED LIST DOCD IN RCRD: CPT | Mod: CPTII,S$GLB,, | Performed by: HOSPITALIST

## 2025-03-17 PROCEDURE — 99213 OFFICE O/P EST LOW 20 MIN: CPT | Mod: S$GLB,,, | Performed by: HOSPITALIST

## 2025-03-17 PROCEDURE — 1160F RVW MEDS BY RX/DR IN RCRD: CPT | Mod: CPTII,S$GLB,, | Performed by: HOSPITALIST

## 2025-03-17 PROCEDURE — 3288F FALL RISK ASSESSMENT DOCD: CPT | Mod: CPTII,S$GLB,, | Performed by: HOSPITALIST

## 2025-03-17 NOTE — PROCEDURES
Clinical Guide to Interpretation or FeNO Levels :    FeNO  (ppb) LOW INTERMEDIATE HIGH   ADULT VALUES < 25 25-50          > 50   Th2-driven Inflammation Unlikely Likely Significant     Patients FeNO level at this visit : __8__ (ppb)    Interpretation of FeNO measurement in adults:  [x] FENO is less than 25 ppb implies non eosinophilic airway inflammation or the absence of airway inflammation.    Comment: Low FENO (<25 ppb) in adult asthmatics with persistent symptoms suggests other etiologies for these symptoms and a lower likelihood of benefit from adding or increasing inhaled glucocorticoids.    [] FENO between 25 and 50 ppb in adults should be interpreted cautiously with reference to the clinical situation (eg, symptomatic, on or off therapy, current smoking).    [] FENO greater than 50 ppb in adults  suggests eosinophilic airway inflammation   Comment: High FENO (>50 ppb) in adult asthmatics even with atypical symptoms suggests glucocorticoid responsiveness. High FENO (>50 ppb) can help identify poor adherence or uncontrolled inflammation in asthma patients with otherwise seemingly controlled asthma.    Discussion:  A FENO less than 25 ppb in adults and less than 20 ppb in children younger than 12 years of age implies noneosinophilic airway inflammation or the absence of airway inflammation.  A FENO greater than 50 ppb in adults or greater than 35 ppb in children suggests eosinophilic airway inflammation.   Values of FENO between 25 and 50 ppb in adults (20 to 35 ppb in children) should be interpreted cautiously with reference to the clinical situation (eg, symptomatic, on or off therapy, current smoking).  A rising FENO with a greater than 20 percent change and more than 25 ppb (20 ppb in children) from a previously stable level suggests increasing eosinophilic airway inflammation, but there are wide inter-individual differences.  A decrease in FENO greater than 20 percent for values over 50 ppb or more than  10 ppb for values less than 50 ppb may be clinically important.  ?FENO in other respiratory diseases - Several other diseases are associated with altered levels of exhaled NO: low levels of FENO have been noted in cystic fibrosis, current smoking, pulmonary hypertension, hypothermia, primary ciliary dyskinesia, and bronchopulmonary dysplasia. Elevated FENO has been noted in atopy, nonasthmatic eosinophilic bronchitis, COPD exacerbations, noncystic fibrosis bronchiectasis, and viral upper respiratory infections.    REFERENCE:  ATS Board of Directors, December 2004, and by the ERS Executive Committee, June 2004. ATS/ERS Recommendations for Standardized Procedures for the Online and Offline Measurement of Exhaled Lower Respiratory Nitric Oxide and Nasal Nitric Oxide. Guideline 2005

## 2025-03-17 NOTE — ASSESSMENT & PLAN NOTE
- Snores, hx nocturnal hypoxemia without prior sleep apnea testing  - PSG ordered given hx chronic lung disease, obesity, HTN

## 2025-03-17 NOTE — ASSESSMENT & PLAN NOTE
- current, a little less than half pack a day  - ldct in December needs to be repeated- pt agreeable
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
## 2025-03-27 ENCOUNTER — HOSPITAL ENCOUNTER (OUTPATIENT)
Dept: RADIOLOGY | Facility: HOSPITAL | Age: 70
Discharge: HOME | End: 2025-03-27
Attending: HOSPITALIST
Payer: MEDICARE

## 2025-03-27 ENCOUNTER — RESULTS FOLLOW-UP (OUTPATIENT)
Dept: PULMONOLOGY | Facility: CLINIC | Age: 70
End: 2025-03-27

## 2025-03-27 DIAGNOSIS — F17.210 NICOTINE DEPENDENCE, CIGARETTES, UNCOMPLICATED: ICD-10-CM

## 2025-03-27 DIAGNOSIS — F17.210 NICOTINE DEPENDENCE, CIGARETTES, UNCOMPLICATED: Primary | ICD-10-CM

## 2025-03-27 PROCEDURE — 71250 CT THORAX DX C-: CPT | Mod: 26,,, | Performed by: RADIOLOGY

## 2025-03-27 PROCEDURE — 71250 CT THORAX DX C-: CPT | Mod: TC

## 2025-05-21 ENCOUNTER — TELEPHONE (OUTPATIENT)
Dept: PULMONOLOGY | Facility: CLINIC | Age: 70
End: 2025-05-21
Payer: MEDICARE

## 2025-05-21 NOTE — TELEPHONE ENCOUNTER
Returned call back. Spoke to Angela and clarified RX. ----- Message from So sent at 5/21/2025  1:47 PM CDT -----  Type:  Pharmacy Calling to Clarify an RXName of Caller:AngelaPharmpatti Name:[facility]:ANORO ELLIPTA 62.5-25 mcg/actuation DsDvWhat do they need to clarify?switch to genericBest Call Back Number:[phone] fax [phone] Information: a generic will be more cost efficient for the patient needs to know who that be okay

## 2025-06-03 ENCOUNTER — HOSPITAL ENCOUNTER (OUTPATIENT)
Dept: SLEEP MEDICINE | Facility: HOSPITAL | Age: 70
Discharge: HOME | End: 2025-06-03
Attending: HOSPITALIST
Payer: MEDICARE

## 2025-06-03 DIAGNOSIS — G47.30 SLEEP-DISORDERED BREATHING: ICD-10-CM

## 2025-06-03 PROCEDURE — 95810 POLYSOM 6/> YRS 4/> PARAM: CPT

## 2025-06-10 ENCOUNTER — TELEPHONE (OUTPATIENT)
Dept: PULMONOLOGY | Facility: CLINIC | Age: 70
End: 2025-06-10
Payer: MEDICARE

## 2025-06-10 DIAGNOSIS — G47.30 SLEEP-DISORDERED BREATHING: Primary | ICD-10-CM

## 2025-06-18 ENCOUNTER — LAB VISIT (OUTPATIENT)
Dept: LAB | Facility: HOSPITAL | Age: 70
End: 2025-06-18
Attending: HOSPITALIST
Payer: MEDICARE

## 2025-06-18 ENCOUNTER — RESULTS FOLLOW-UP (OUTPATIENT)
Dept: PULMONOLOGY | Facility: CLINIC | Age: 70
End: 2025-06-18

## 2025-06-18 ENCOUNTER — OFFICE VISIT (OUTPATIENT)
Dept: PULMONOLOGY | Facility: CLINIC | Age: 70
End: 2025-06-18
Payer: MEDICARE

## 2025-06-18 ENCOUNTER — CLINICAL SUPPORT (OUTPATIENT)
Dept: PULMONOLOGY | Facility: CLINIC | Age: 70
End: 2025-06-18
Payer: MEDICARE

## 2025-06-18 VITALS
RESPIRATION RATE: 19 BRPM | BODY MASS INDEX: 33.09 KG/M2 | HEART RATE: 100 BPM | OXYGEN SATURATION: 96 % | HEIGHT: 61 IN | DIASTOLIC BLOOD PRESSURE: 70 MMHG | SYSTOLIC BLOOD PRESSURE: 116 MMHG | WEIGHT: 175.25 LBS

## 2025-06-18 DIAGNOSIS — E87.20 METABOLIC ACIDOSIS: Primary | ICD-10-CM

## 2025-06-18 DIAGNOSIS — J44.9 CHRONIC OBSTRUCTIVE PULMONARY DISEASE, UNSPECIFIED COPD TYPE: ICD-10-CM

## 2025-06-18 DIAGNOSIS — J41.0 SIMPLE CHRONIC BRONCHITIS: ICD-10-CM

## 2025-06-18 DIAGNOSIS — E87.20 METABOLIC ACIDOSIS: ICD-10-CM

## 2025-06-18 DIAGNOSIS — G47.33 OBSTRUCTIVE SLEEP APNEA SYNDROME: ICD-10-CM

## 2025-06-18 DIAGNOSIS — G47.30 SLEEP-DISORDERED BREATHING: ICD-10-CM

## 2025-06-18 LAB
ALBUMIN SERPL BCP-MCNC: 3.4 G/DL (ref 3.5–5.2)
ALLENS TEST: ABNORMAL
ALP SERPL-CCNC: 80 UNIT/L (ref 40–150)
ALT SERPL W/O P-5'-P-CCNC: 10 UNIT/L (ref 10–44)
ANION GAP (OHS): 11 MMOL/L (ref 8–16)
AST SERPL-CCNC: 10 UNIT/L (ref 11–45)
BILIRUB SERPL-MCNC: 0.3 MG/DL (ref 0.1–1)
BUN SERPL-MCNC: 44 MG/DL (ref 8–23)
CALCIUM SERPL-MCNC: 9.2 MG/DL (ref 8.7–10.5)
CHLORIDE SERPL-SCNC: 110 MMOL/L (ref 95–110)
CO2 SERPL-SCNC: 18 MMOL/L (ref 23–29)
CREAT SERPL-MCNC: 2.4 MG/DL (ref 0.5–1.4)
DELSYS: ABNORMAL
FIO2: 21
GFR SERPLBLD CREATININE-BSD FMLA CKD-EPI: 21 ML/MIN/1.73/M2
GLUCOSE SERPL-MCNC: 85 MG/DL (ref 70–110)
HCO3 UR-SCNC: 18.6 MMOL/L (ref 24–28)
LACTATE SERPL-SCNC: 1.6 MMOL/L (ref 0.5–2.2)
MODE: ABNORMAL
PCO2 BLDA: 38.3 MMHG (ref 35–45)
PH SMN: 7.29 [PH] (ref 7.35–7.45)
PO2 BLDA: 73 MMHG (ref 80–100)
POC BE: -8 MMOL/L (ref -2–2)
POC SATURATED O2: 93 % (ref 95–100)
POTASSIUM SERPL-SCNC: 5.1 MMOL/L (ref 3.5–5.1)
PROT SERPL-MCNC: 7.3 GM/DL (ref 6–8.4)
SAMPLE: ABNORMAL
SITE: ABNORMAL
SODIUM SERPL-SCNC: 139 MMOL/L (ref 136–145)

## 2025-06-18 PROCEDURE — 83605 ASSAY OF LACTIC ACID: CPT

## 2025-06-18 PROCEDURE — 99999 PR PBB SHADOW E&M-EST. PATIENT-LVL V: CPT | Mod: PBBFAC,,, | Performed by: HOSPITALIST

## 2025-06-18 PROCEDURE — 80053 COMPREHEN METABOLIC PANEL: CPT

## 2025-06-18 PROCEDURE — 36415 COLL VENOUS BLD VENIPUNCTURE: CPT

## 2025-06-18 RX ORDER — IPRATROPIUM BROMIDE AND ALBUTEROL SULFATE 2.5; .5 MG/3ML; MG/3ML
3 SOLUTION RESPIRATORY (INHALATION) EVERY 6 HOURS PRN
Qty: 75 ML | Refills: 5 | Status: SHIPPED | OUTPATIENT
Start: 2025-06-18

## 2025-06-18 RX ORDER — ALBUTEROL SULFATE 90 UG/1
1 INHALANT RESPIRATORY (INHALATION) EVERY 6 HOURS PRN
Qty: 6.7 G | Refills: 11 | Status: SHIPPED | OUTPATIENT
Start: 2025-06-18

## 2025-06-18 RX ORDER — UMECLIDINIUM BROMIDE AND VILANTEROL TRIFENATATE 62.5; 25 UG/1; UG/1
1 POWDER RESPIRATORY (INHALATION) DAILY
Qty: 60 EACH | Refills: 11 | Status: SHIPPED | OUTPATIENT
Start: 2025-06-18

## 2025-06-18 NOTE — ASSESSMENT & PLAN NOTE
- PSG 6/2025- Mild obstructive sleep apnea with AHI 5.3 worse during REM and supine sleep, SpO2 norm 80% with 92 minutes spent with SpO2 less than 89%, periodic limb movements in sleep with 57 events per hour with an arousal index of 10.6/hr  - ABG done today to assess for chronic hypercapnia, showed metabolic acidosis  - will repeat in a month to reassess  - continue nocturnal oxygen

## 2025-06-18 NOTE — PROCEDURES
ABG completed. See ABG Below.     Latest Reference Range & Units 06/18/25 08:22   POC PH 7.35 - 7.45  7.294 (L)   POC PCO2 35 - 45 mmHg 38.3   POC PO2 80 - 100 mmHg 73 (L)   POC HCO3 24 - 28 mmol/L 18.6 (L)   POC SATURATED O2 95 - 100 % 93   Sample  ARTERIAL   POC BE -2 to 2 mmol/L -8 (L)   FiO2  21   DelSys  Room Air   Site  RR   Mode  SPONT   (L): Data is abnormally low      Interpretation:  [] Arterial blood gases on room air demonstrate normal pCO2 and pO2  [] Arterial blood gases on room air are abnormal demonstrating hypercarbia (pCO2 >45 mmHg)  [] Arterial blood gases on room air are abnormal demonstrating hypocarbia (pCO2 < 35 mmHg)  [] Arterial blood gases on room air are abnormal demonstrating hypoxemia (pO2 < 80 mmHg)  [] Arterial blood gases on room air are abnormal demonstrating hyperoxemia (pO2 >120 mmHg)  [] Arterial blood gases on room air are abnormal demonstrating hypoxemia (pO2 < 80 mmHg) and hypercarbia (pCO2>45 mmHg)    The table below summarizes the main interpretations of the relationship between the arterial blood gases, pH, pCO2 and HCO-3    []    I

## 2025-06-18 NOTE — PROGRESS NOTES
"Subjective:      Patient ID: Bebe Sandhu is a 69 y.o. female.    Chief Complaint: Shortness of breath    Interval Hx 6/18/25:    Ms. Sandhu presents today for follow up shortness of breath and PSG review.     Non-fasting ferritin  Metabolic acidosis- glucose has been running low to normal, Jardiance recently decreased from 25 to 10 by Renal    Interim Testing:  - ABG 6/2025- Metabolic acidosis with pH 7.294  - PSG 6/2025- Mild obstructive sleep apnea with AHI 5.3 worse during REM and supine sleep, SpO2 norm 80% with 92 minutes spent with SpO2 less than 89%, periodic limb movements in sleep with 57 events per hour with an arousal index of 10.6/hr    Pulmonary Interventions:  - Anoro- tries to remember to do everyday  - Albuterol hfa- used this morning- doesn't have AC in her car    Review of Systems   Respiratory:  Negative for cough, wheezing and dyspnea on extertion.      Objective:     Physical Exam   Constitutional: She is oriented to person, place, and time. She appears well-developed and well-nourished. She is obese.   Cardiovascular: Normal rate and regular rhythm.   Pulmonary/Chest: Normal expansion, effort normal and breath sounds normal.   Neurological: She is alert and oriented to person, place, and time.   Skin: Skin is warm and dry.     Personal Diagnostic Review  As Above      3/17/2025     9:44 AM 2/20/2025    11:08 AM 11/4/2024     2:42 PM 8/22/2024    10:36 AM 7/22/2024    11:35 AM 7/22/2024    11:08 AM 2/22/2024    10:25 AM   Pulmonary Function Tests   SpO2 97 %  92 %  95 %     Ordering Provider      YOVANA Barker    Performing nurse/tech/RT      C. Credeur, RRT    Diagnosis      COPD     Height 5' 1" (1.549 m) 5' 1" (1.549 m) 5' 1" (1.549 m) 5' 1" (1.549 m) 5' 1" (1.549 m) 5' 1" (1.549 m) 5' 1" (1.549 m)   Weight 79.2 kg (174 lb 9.7 oz) 79.2 kg (174 lb 9.7 oz) 78.1 kg (172 lb 2.9 oz) 77.4 kg (170 lb 10.2 oz) 75.8 kg (167 lb) 76 kg (167 lb 7 oz) 81.1 kg (178 lb 12.7 oz)   BMI " (Calculated) 33 33 32.5 32.3 31.6 31.7 33.8   Patient Race          6MWT Status      completed without stopping    Patient Reported      --     Was O2 used?      Yes    Delivery Method      Cannula;Pull Tank    6MW Distance walked (feet)      1100 feet    Distance walked (meters)      335.28 meters    Did patient stop?      No    Type of assistive device(s) used?      no assistive devices     Oxygen Saturation      92 %    Supplemental Oxygen      Room Air     Heart Rate      99 bpm    Blood Pressure      160/65    Manda Dyspnea Rating       moderate    Oxygen Saturation      94 %    Supplemental Oxygen      2 L/M     Heart Rate      107 bpm    Blood Pressure      210/78    Amnda Dyspnea Rating       heavy    Recovery Time (seconds)      240 seconds    Oxygen Saturation      96 %    Supplemental Oxygen      2 L/M     Heart Rate      101 bpm    Blood Pressure      160/60    Manda Dyspnea Rating       moderate    Is procedure ready for interpretation?      Yes    Oxygen Qualification?      Yes    Oxygen Saturation      92 %    Supplemental Oxygen      Room Air    Heart Rate      99 bpm    Blood Pressure      160/65    Manda Dyspnea Rating       moderate    Oxygen Saturation      88 %    Supplemental Oxygen      Room Air    Heart Rate      112 bpm    Blood Pressure      210/78    Manda Dyspnea Rating       moderate    Recovery Time (seconds)      240 seconds    Oxygen Saturation      96 %    Supplemental Oxygen      2 L/M    Heart Rate      101 bpm    Blood Pressure      160/60    Manda Dyspnea Rating       moderate        Data saved with a previous flowsheet row definition        Assessment:     No diagnosis found.     Encounter Medications[1]  No orders of the defined types were placed in this encounter.        Plan:     Problem List Items Addressed This Visit       Simple chronic bronchitis    - continue anoro and albuterol as needed         Metabolic acidosis - Primary    - pt feels well and at baseline  -  lactic acid and cmp asap  - glucose has been running low-normal, decreasing Metformin from 1000mg bid to 500mg bid for now  - per pt, Jardiance was recently decreased from 25 to 10mg  - will reach out to patient's care team- Nephrologist, Dr. Monae, DM management Keisha Chou, BRAD, primary care Dr. Fernandes after labs resulted           Relevant Orders    Lactic Acid, Plasma    Comprehensive Metabolic Panel    PFT - related Arterial Blood Gas    Obstructive sleep apnea syndrome    - PSG 6/2025- Mild obstructive sleep apnea with AHI 5.3 worse during REM and supine sleep, SpO2 norm 80% with 92 minutes spent with SpO2 less than 89%, periodic limb movements in sleep with 57 events per hour with an arousal index of 10.6/hr  - ABG done today to assess for chronic hypercapnia, showed metabolic acidosis  - will repeat in a month to reassess  - continue nocturnal oxygen          Other Visit Diagnoses         Chronic obstructive pulmonary disease, unspecified COPD type        Relevant Medications    albuterol (PROVENTIL/VENTOLIN HFA) 90 mcg/actuation inhaler    albuterol-ipratropium (DUO-NEB) 2.5 mg-0.5 mg/3 mL nebulizer solution    ANORO ELLIPTA 62.5-25 mcg/actuation DsDv          Follow up in one month with ABG           [1]   Outpatient Encounter Medications as of 6/18/2025   Medication Sig Dispense Refill    albuterol (PROVENTIL/VENTOLIN HFA) 90 mcg/actuation inhaler Inhale 1 puff into the lungs every 6 (six) hours as needed for Wheezing. Rescue 6.7 g 11    albuterol-ipratropium (DUO-NEB) 2.5 mg-0.5 mg/3 mL nebulizer solution Take 3 mLs by nebulization every 6 (six) hours as needed for Wheezing or Shortness of Breath. 75 mL 5    alendronate (FOSAMAX) 70 MG tablet Take 70 mg by mouth every 7 days.      alprazolam (XANAX) 1 MG tablet Take 1 mg by mouth 2 (two) times daily.      amLODIPine (NORVASC) 5 MG tablet Take 2.5 mg by mouth.      ANORO ELLIPTA 62.5-25 mcg/actuation DsDv Inhale 1 puff into the lungs once daily. 60  each 11    aspirin 81 MG Chew Take 81 mg by mouth once daily.      blood-glucose sensor (FREESTYLE LAINEY 3 PLUS SENSOR) Torrie Change sensor every 15 days 2 each 11    cholecalciferol, vitamin D3, 125 mcg (5,000 unit) Tab Take 1 tablet by mouth.      citalopram (CELEXA) 20 MG tablet Take 20 mg by mouth every morning.  1    cloNIDine (CATAPRES) 0.2 MG tablet Take 0.2 mg by mouth once.      ezetimibe (ZETIA) 10 mg tablet Take 10 mg by mouth.      famotidine (PEPCID) 20 MG tablet Take 20 mg by mouth nightly as needed for Heartburn.      ferrous sulfate (FEOSOL) 325 mg (65 mg iron) Tab tablet 1 tablet      flash glucose sensor (FREESTYLE LAINEY 2 SENSOR) Kit Change sensor every 14 days 6 kit 3    folic acid (FOLVITE) 1 MG tablet Take 1 mg by mouth.      furosemide (LASIX) 20 MG tablet Take 20 mg by mouth once daily.      hydrochlorothiazide (HYDRODIURIL) 25 MG tablet Take 25 mg by mouth once daily.      HYDROcodone-acetaminophen (NORCO) 5-325 mg per tablet Take 1 tablet by mouth every 8 (eight) hours.      icosapent ethyl 1 gram Cap Take 2 g by mouth.      insulin degludec (TRESIBA FLEXTOUCH U-200) 200 unit/mL (3 mL) insulin pen INJECT 12 UNITS SUBCUTANEOUSLY DAILY (BULK) 3 Pen 5    JARDIANCE 25 mg tablet Take 1 tablet (25 mg total) by mouth once daily. 90 tablet 3    levalbuterol (XOPENEX HFA) 45 mcg/actuation inhaler Inhale 2 puffs into the lungs every 4 (four) hours as needed for Wheezing or Shortness of Breath. 15 g 11    levothyroxine (SYNTHROID) 50 MCG tablet Take 50 mcg by mouth.      lisinopriL 10 MG tablet Take 10 mg by mouth.      loratadine (CLARITIN) 10 mg tablet Take 10 mg by mouth once daily.      meloxicam (MOBIC) 7.5 MG tablet Take 7.5 mg by mouth once daily.      metFORMIN (GLUCOPHAGE) 1000 MG tablet Take 1 tablet (1,000 mg total) by mouth 2 (two) times daily. 180 tablet 3    mirtazapine (REMERON) 7.5 MG Tab Take 7.5 mg by mouth.      nicotine (NICODERM CQ) 14 mg/24 hr Place 1 patch onto the skin once  "daily. 14 patch 0    nicotine polacrilex 4 MG Lozg Take 4 mg by mouth as needed.      pantoprazole (PROTONIX) 20 MG tablet Take 20 mg by mouth every morning.      pen needle, diabetic (BD ULTRA-FINE IAN PEN NEEDLE) 32 gauge x 5/32" Ndle use DAILY with tresiba 100 each 3    pneumoc 20-reena conj-dip cr,PF, (PREVNAR 20, PF,) 0.5 mL Syrg injection Inject 0.5 ml into the muscle. 0.5 mL 0    potassium chloride SA (K-DUR,KLOR-CON) 20 MEQ tablet Take 20 mEq by mouth once daily.      predniSONE (DELTASONE) 20 MG tablet Take 1 tablet (20 mg total) by mouth 2 (two) times daily. 10 tablet 0    quinapril (ACCUPRIL) 20 MG tablet Take 20 mg by mouth every evening.      rosuvastatin (CRESTOR) 10 MG tablet Take 10 mg by mouth once daily.      thiamine 100 MG tablet Take by mouth.      venlafaxine (EFFEXOR-XR) 75 MG 24 hr capsule Take 75 mg by mouth once daily.      vitamin E 600 UNIT capsule Take 1 capsule by mouth once daily.       No facility-administered encounter medications on file as of 6/18/2025.     "

## 2025-06-18 NOTE — ASSESSMENT & PLAN NOTE
- pt feels well and at baseline  - lactic acid and cmp asap  - glucose has been running low-normal, decreasing Metformin from 1000mg bid to 500mg bid for now  - per pt, Jardiance was recently decreased from 25 to 10mg  - will reach out to patient's care team- Nephrologist, Dr. Monae, DM management Keisha Chou NP, primary care Dr. Fernandes after labs resulted

## 2025-07-16 ENCOUNTER — CLINICAL SUPPORT (OUTPATIENT)
Dept: PULMONOLOGY | Facility: CLINIC | Age: 70
End: 2025-07-16
Payer: MEDICARE

## 2025-07-16 ENCOUNTER — OFFICE VISIT (OUTPATIENT)
Dept: PULMONOLOGY | Facility: CLINIC | Age: 70
End: 2025-07-16
Payer: MEDICARE

## 2025-07-16 VITALS
OXYGEN SATURATION: 94 % | RESPIRATION RATE: 20 BRPM | HEART RATE: 91 BPM | BODY MASS INDEX: 33.71 KG/M2 | DIASTOLIC BLOOD PRESSURE: 60 MMHG | HEIGHT: 61 IN | SYSTOLIC BLOOD PRESSURE: 96 MMHG | WEIGHT: 178.56 LBS

## 2025-07-16 DIAGNOSIS — J41.0 SIMPLE CHRONIC BRONCHITIS: ICD-10-CM

## 2025-07-16 DIAGNOSIS — E87.20 METABOLIC ACIDOSIS: ICD-10-CM

## 2025-07-16 DIAGNOSIS — J44.9 CHRONIC OBSTRUCTIVE PULMONARY DISEASE, UNSPECIFIED COPD TYPE: ICD-10-CM

## 2025-07-16 DIAGNOSIS — G47.33 OBSTRUCTIVE SLEEP APNEA SYNDROME: Primary | ICD-10-CM

## 2025-07-16 LAB
ALLENS TEST: ABNORMAL
DELSYS: ABNORMAL
HCO3 UR-SCNC: 21.6 MMOL/L (ref 24–28)
MODE: ABNORMAL
PCO2 BLDA: 43.6 MMHG (ref 35–45)
PH SMN: 7.3 [PH] (ref 7.35–7.45)
PO2 BLDA: 47 MMHG (ref 80–100)
POC BE: -5 MMOL/L (ref -2–2)
POC SATURATED O2: 78 % (ref 95–100)
SAMPLE: ABNORMAL
SITE: ABNORMAL

## 2025-07-16 PROCEDURE — 99999 PR PBB SHADOW E&M-EST. PATIENT-LVL V: CPT | Mod: PBBFAC,,, | Performed by: HOSPITALIST

## 2025-07-16 NOTE — ASSESSMENT & PLAN NOTE
- PSG 6/2025- Mild obstructive sleep apnea with AHI 5.3 worse during REM and supine sleep, SpO2 norm 80% with 92 minutes spent with SpO2 less than 89%, periodic limb movements in sleep with 57 events per hour with an arousal index of 10.6/hr   - was planning on repeating ABG today to definitely rule out chronic hypercapnia, but multiple attempts for ABG were venous  - will go ahead and proceed with CPAP titration  - continue nocturnal oxygen for now

## 2025-07-16 NOTE — PROGRESS NOTES
Two attempts. Mixed sample obtained. KENNETH Arrietayemagda was notified and stated 3rd attempt was not necessary.

## 2025-07-16 NOTE — PROGRESS NOTES
Subjective:      Patient ID: Bebe Sandhu is a 69 y.o. female.    Chief Complaint: COPD, nocturnal hypoxemia, sleep apnea    Interval Hx 7/16/25:    Ms. Sandhu presents to Pulmonary clinic for follow up COPD and nocturnal hypoxemia. She was last seen 6/2025 for PSG review which showed mild/borderline sleep apnea with significant hypoxemia, ABG was done which showed a metabolic acidosis, metformin reduced. She was continued on nocturnal oxygen as well as Anoro for COPD    Mixed gas today despite two attempts. No other change in meds since last seen- she is seeing Diabetes provider tomorrow. Deferring further management of metabolic acidosis/diabetes meds to Endo/nephrology.    Pulmonary Interventions:  - Nocturnal O2- 2.5L at night  - Anoro- every morning when she remembers  - Albuterol- not using much    Interval Hx 6/18/25:     Ms. Sandhu presents today for follow up shortness of breath and PSG review.      Metabolic acidosis- glucose has been running low to normal, Jardiance recently decreased from 25 to 10 by Renal     Interim Testing:  - ABG 6/2025- Metabolic acidosis with pH 7.294  - PSG 6/2025- Mild obstructive sleep apnea with AHI 5.3 worse during REM and supine sleep, SpO2 norm 80% with 92 minutes spent with SpO2 less than 89%, periodic limb movements in sleep with 57 events per hour with an arousal index of 10.6/hr     Pulmonary Interventions:  - Anoro- tries to remember to do everyday  - Albuterol hfa- used this morning- doesn't have AC in her car    Review of Systems   Respiratory:  Positive for dyspnea on extertion. Negative for cough, sputum production, wheezing and somnolence.      Objective:     Physical Exam   Constitutional: She is oriented to person, place, and time. She appears well-developed and well-nourished. She is obese.   Cardiovascular: Normal rate and regular rhythm.   Pulmonary/Chest:   Breath sounds a little tight, normal effort on room air at rest and with  "conversation   Neurological: She is alert and oriented to person, place, and time.   Skin: Skin is warm and dry.     Personal Diagnostic Review  As Above      6/18/2025     8:29 AM 3/17/2025     9:44 AM 2/20/2025    11:08 AM 11/4/2024     2:42 PM 8/22/2024    10:36 AM 7/22/2024    11:35 AM 7/22/2024    11:08 AM   Pulmonary Function Tests   SpO2 96 % 97 %  92 %  95 %    Ordering Provider       YOVANA Barker   Performing nurse/tech/RT       C. Credeur, RRT   Diagnosis       COPD    Height 5' 1" (1.549 m) 5' 1" (1.549 m) 5' 1" (1.549 m) 5' 1" (1.549 m) 5' 1" (1.549 m) 5' 1" (1.549 m) 5' 1" (1.549 m)   Weight 79.5 kg (175 lb 4.3 oz) 79.2 kg (174 lb 9.7 oz) 79.2 kg (174 lb 9.7 oz) 78.1 kg (172 lb 2.9 oz) 77.4 kg (170 lb 10.2 oz) 75.8 kg (167 lb) 76 kg (167 lb 7 oz)   BMI (Calculated) 33.1 33 33 32.5 32.3 31.6 31.7   Patient Race          6MWT Status       completed without stopping   Patient Reported       --    Was O2 used?       Yes   Delivery Method       Cannula;Pull Tank   6MW Distance walked (feet)       1100 feet   Distance walked (meters)       335.28 meters   Did patient stop?       No   Type of assistive device(s) used?       no assistive devices    Oxygen Saturation       92 %   Supplemental Oxygen       Room Air    Heart Rate       99 bpm   Blood Pressure       160/65   Manda Dyspnea Rating        moderate   Oxygen Saturation       94 %   Supplemental Oxygen       2 L/M    Heart Rate       107 bpm   Blood Pressure       210/78   Manda Dyspnea Rating        heavy   Recovery Time (seconds)       240 seconds   Oxygen Saturation       96 %   Supplemental Oxygen       2 L/M    Heart Rate       101 bpm   Blood Pressure       160/60   Manda Dyspnea Rating        moderate   Is procedure ready for interpretation?       Yes   Oxygen Qualification?       Yes   Oxygen Saturation       92 %   Supplemental Oxygen       Room Air   Heart Rate       99 bpm   Blood Pressure       160/65   Manda Dyspnea Rating        " moderate   Oxygen Saturation       88 %   Supplemental Oxygen       Room Air   Heart Rate       112 bpm   Blood Pressure       210/78   Manda Dyspnea Rating        moderate   Recovery Time (seconds)       240 seconds   Oxygen Saturation       96 %   Supplemental Oxygen       2 L/M   Heart Rate       101 bpm   Blood Pressure       160/60   Manda Dyspnea Rating        moderate       Data saved with a previous flowsheet row definition        Assessment:     No diagnosis found.     Encounter Medications[1]  No orders of the defined types were placed in this encounter.    Plan:     Problem List Items Addressed This Visit       Simple chronic bronchitis    - continue anoro and albuterol as needed  - follow up with hayde         Metabolic acidosis    - per endo/nephrology/pcp         Obstructive sleep apnea syndrome - Primary    - PSG 6/2025- Mild obstructive sleep apnea with AHI 5.3 worse during REM and supine sleep, SpO2 norm 80% with 92 minutes spent with SpO2 less than 89%, periodic limb movements in sleep with 57 events per hour with an arousal index of 10.6/hr   - was planning on repeating ABG today to definitely rule out chronic hypercapnia, but multiple attempts for ABG were venous  - will go ahead and proceed with CPAP titration  - continue nocturnal oxygen for now         Relevant Orders    BiPAP/CPAP Titration ((Must have dx of TREY from previous sleep study)     Other Visit Diagnoses         Chronic obstructive pulmonary disease, unspecified COPD type        Relevant Orders    Spirometry with/without bronchodilator                   [1]   Outpatient Encounter Medications as of 7/16/2025   Medication Sig Dispense Refill    albuterol (PROVENTIL/VENTOLIN HFA) 90 mcg/actuation inhaler Inhale 1 puff into the lungs every 6 (six) hours as needed for Wheezing. Rescue 6.7 g 11    albuterol-ipratropium (DUO-NEB) 2.5 mg-0.5 mg/3 mL nebulizer solution Take 3 mLs by nebulization every 6 (six) hours as needed for Wheezing or  Shortness of Breath. 75 mL 5    alendronate (FOSAMAX) 70 MG tablet Take 70 mg by mouth every 7 days.      alprazolam (XANAX) 1 MG tablet Take 1 mg by mouth 2 (two) times daily.      amLODIPine (NORVASC) 5 MG tablet Take 2.5 mg by mouth.      ANORO ELLIPTA 62.5-25 mcg/actuation DsDv Inhale 1 puff into the lungs once daily. 60 each 11    aspirin 81 MG Chew Take 81 mg by mouth once daily.      blood-glucose sensor (FREESTYLE LAINEY 3 PLUS SENSOR) Torrie Change sensor every 15 days 2 each 11    cholecalciferol, vitamin D3, 125 mcg (5,000 unit) Tab Take 1 tablet by mouth.      citalopram (CELEXA) 20 MG tablet Take 20 mg by mouth every morning.  1    cloNIDine (CATAPRES) 0.2 MG tablet Take 0.2 mg by mouth once.      ezetimibe (ZETIA) 10 mg tablet Take 10 mg by mouth.      famotidine (PEPCID) 20 MG tablet Take 20 mg by mouth nightly as needed for Heartburn.      ferrous sulfate (FEOSOL) 325 mg (65 mg iron) Tab tablet 1 tablet      flash glucose sensor (FREESTYLE LAINEY 2 SENSOR) Kit Change sensor every 14 days 6 kit 3    folic acid (FOLVITE) 1 MG tablet Take 1 mg by mouth.      furosemide (LASIX) 20 MG tablet Take 20 mg by mouth once daily.      hydrochlorothiazide (HYDRODIURIL) 25 MG tablet Take 25 mg by mouth once daily.      HYDROcodone-acetaminophen (NORCO) 5-325 mg per tablet Take 1 tablet by mouth every 8 (eight) hours.      icosapent ethyl 1 gram Cap Take 2 g by mouth.      insulin degludec (TRESIBA FLEXTOUCH U-200) 200 unit/mL (3 mL) insulin pen INJECT 12 UNITS SUBCUTANEOUSLY DAILY (BULK) 3 Pen 5    JARDIANCE 25 mg tablet Take 1 tablet (25 mg total) by mouth once daily. 90 tablet 3    levalbuterol (XOPENEX HFA) 45 mcg/actuation inhaler Inhale 2 puffs into the lungs every 4 (four) hours as needed for Wheezing or Shortness of Breath. 15 g 11    levothyroxine (SYNTHROID) 50 MCG tablet Take 50 mcg by mouth.      lisinopriL 10 MG tablet Take 10 mg by mouth.      loratadine (CLARITIN) 10 mg tablet Take 10 mg by mouth once  "daily.      meloxicam (MOBIC) 7.5 MG tablet Take 7.5 mg by mouth once daily.      metFORMIN (GLUCOPHAGE) 1000 MG tablet Take 1 tablet (1,000 mg total) by mouth 2 (two) times daily. 180 tablet 3    mirtazapine (REMERON) 7.5 MG Tab Take 7.5 mg by mouth.      nicotine (NICODERM CQ) 14 mg/24 hr Place 1 patch onto the skin once daily. 14 patch 0    nicotine polacrilex 4 MG Lozg Take 4 mg by mouth as needed.      pantoprazole (PROTONIX) 20 MG tablet Take 20 mg by mouth every morning.      pen needle, diabetic (BD ULTRA-FINE IAN PEN NEEDLE) 32 gauge x 5/32" Ndle use DAILY with tresiba 100 each 3    pneumoc 20-reena conj-dip cr,PF, (PREVNAR 20, PF,) 0.5 mL Syrg injection Inject 0.5 ml into the muscle. 0.5 mL 0    potassium chloride SA (K-DUR,KLOR-CON) 20 MEQ tablet Take 20 mEq by mouth once daily.      predniSONE (DELTASONE) 20 MG tablet Take 1 tablet (20 mg total) by mouth 2 (two) times daily. 10 tablet 0    quinapril (ACCUPRIL) 20 MG tablet Take 20 mg by mouth every evening.      rosuvastatin (CRESTOR) 10 MG tablet Take 10 mg by mouth once daily.      thiamine 100 MG tablet Take by mouth.      venlafaxine (EFFEXOR-XR) 75 MG 24 hr capsule Take 75 mg by mouth once daily.      vitamin E 600 UNIT capsule Take 1 capsule by mouth once daily.       No facility-administered encounter medications on file as of 7/16/2025.     "

## 2025-08-31 ENCOUNTER — HOSPITAL ENCOUNTER (OUTPATIENT)
Dept: SLEEP MEDICINE | Facility: HOSPITAL | Age: 70
Discharge: HOME | End: 2025-08-31
Attending: FAMILY MEDICINE
Payer: MEDICARE

## 2025-08-31 DIAGNOSIS — G47.33 OBSTRUCTIVE SLEEP APNEA SYNDROME: ICD-10-CM

## 2025-08-31 PROCEDURE — 95811 POLYSOM 6/>YRS CPAP 4/> PARM: CPT

## 2025-09-01 PROCEDURE — 95811 POLYSOM 6/>YRS CPAP 4/> PARM: CPT | Mod: 26,,, | Performed by: INTERNAL MEDICINE
